# Patient Record
Sex: MALE | Race: BLACK OR AFRICAN AMERICAN | NOT HISPANIC OR LATINO | Employment: PART TIME | URBAN - METROPOLITAN AREA
[De-identification: names, ages, dates, MRNs, and addresses within clinical notes are randomized per-mention and may not be internally consistent; named-entity substitution may affect disease eponyms.]

---

## 2018-10-05 ENCOUNTER — OFFICE VISIT (OUTPATIENT)
Dept: URGENT CARE | Facility: CLINIC | Age: 58
End: 2018-10-05
Payer: OTHER MISCELLANEOUS

## 2018-10-05 VITALS
RESPIRATION RATE: 18 BRPM | OXYGEN SATURATION: 96 % | SYSTOLIC BLOOD PRESSURE: 141 MMHG | DIASTOLIC BLOOD PRESSURE: 79 MMHG | HEIGHT: 70 IN | WEIGHT: 240 LBS | TEMPERATURE: 98 F | HEART RATE: 69 BPM | BODY MASS INDEX: 34.36 KG/M2

## 2018-10-05 DIAGNOSIS — T14.90XA INJURY: Primary | ICD-10-CM

## 2018-10-05 LAB
CTP QC/QA: YES
CTP QC/QA: YES
POC 10 PANEL DRUG SCREEN: NEGATIVE
POC SALIVA ALCOHOL: NEGATIVE

## 2018-10-05 PROCEDURE — 80305 DRUG TEST PRSMV DIR OPT OBS: CPT | Mod: QW,S$GLB,, | Performed by: NURSE PRACTITIONER

## 2018-10-05 PROCEDURE — 80320 DRUG SCREEN QUANTALCOHOLS: CPT | Mod: S$GLB,,, | Performed by: NURSE PRACTITIONER

## 2018-10-05 PROCEDURE — 99203 OFFICE O/P NEW LOW 30 MIN: CPT | Mod: S$GLB,,, | Performed by: NURSE PRACTITIONER

## 2018-10-05 RX ORDER — PRAVASTATIN SODIUM 10 MG/1
10 TABLET ORAL DAILY
COMMUNITY
End: 2022-03-23 | Stop reason: CLARIF

## 2018-10-05 RX ORDER — METFORMIN HYDROCHLORIDE 1000 MG/1
1000 TABLET ORAL
COMMUNITY
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
## 2022-01-07 DIAGNOSIS — M25.561 RIGHT KNEE PAIN, UNSPECIFIED CHRONICITY: Primary | ICD-10-CM

## 2022-01-14 ENCOUNTER — OFFICE VISIT (OUTPATIENT)
Dept: ORTHOPEDICS | Facility: CLINIC | Age: 62
End: 2022-01-14
Payer: OTHER GOVERNMENT

## 2022-01-14 VITALS
HEIGHT: 70 IN | RESPIRATION RATE: 18 BRPM | BODY MASS INDEX: 35.12 KG/M2 | DIASTOLIC BLOOD PRESSURE: 70 MMHG | OXYGEN SATURATION: 99 % | SYSTOLIC BLOOD PRESSURE: 130 MMHG | WEIGHT: 245.31 LBS | HEART RATE: 69 BPM

## 2022-01-14 DIAGNOSIS — M21.161 GENU VARUM OF RIGHT LOWER EXTREMITY: ICD-10-CM

## 2022-01-14 DIAGNOSIS — M17.11 UNILATERAL PRIMARY OSTEOARTHRITIS, RIGHT KNEE: Primary | ICD-10-CM

## 2022-01-14 DIAGNOSIS — M23.91 CHONDRAL DEFECT OF RIGHT TIBIAL PLATEAU: ICD-10-CM

## 2022-01-14 PROCEDURE — 99203 OFFICE O/P NEW LOW 30 MIN: CPT | Mod: S$PBB,,, | Performed by: ORTHOPAEDIC SURGERY

## 2022-01-14 PROCEDURE — 99213 OFFICE O/P EST LOW 20 MIN: CPT | Mod: PBBFAC,PN | Performed by: ORTHOPAEDIC SURGERY

## 2022-01-14 PROCEDURE — 99999 PR PBB SHADOW E&M-EST. PATIENT-LVL III: CPT | Mod: PBBFAC,,, | Performed by: ORTHOPAEDIC SURGERY

## 2022-01-14 PROCEDURE — 99999 PR PBB SHADOW E&M-EST. PATIENT-LVL III: ICD-10-PCS | Mod: PBBFAC,,, | Performed by: ORTHOPAEDIC SURGERY

## 2022-01-14 PROCEDURE — 99203 PR OFFICE/OUTPT VISIT, NEW, LEVL III, 30-44 MIN: ICD-10-PCS | Mod: S$PBB,,, | Performed by: ORTHOPAEDIC SURGERY

## 2022-01-14 RX ORDER — LISINOPRIL 20 MG/1
20 TABLET ORAL DAILY
COMMUNITY

## 2022-01-14 RX ORDER — NAPROXEN SODIUM 220 MG/1
81 TABLET, FILM COATED ORAL DAILY
Status: ON HOLD | COMMUNITY
End: 2022-07-14 | Stop reason: SDUPTHER
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
## 2022-01-21 ENCOUNTER — OFFICE VISIT (OUTPATIENT)
Dept: ORTHOPEDICS | Facility: CLINIC | Age: 62
End: 2022-01-21
Payer: OTHER GOVERNMENT

## 2022-01-21 VITALS
RESPIRATION RATE: 18 BRPM | DIASTOLIC BLOOD PRESSURE: 70 MMHG | WEIGHT: 245 LBS | OXYGEN SATURATION: 99 % | HEIGHT: 70 IN | BODY MASS INDEX: 35.07 KG/M2 | HEART RATE: 83 BPM | SYSTOLIC BLOOD PRESSURE: 130 MMHG

## 2022-01-21 DIAGNOSIS — M17.11 UNILATERAL PRIMARY OSTEOARTHRITIS, RIGHT KNEE: Primary | ICD-10-CM

## 2022-01-21 PROCEDURE — 99999 PR PBB SHADOW E&M-EST. PATIENT-LVL III: CPT | Mod: PBBFAC,,, | Performed by: ORTHOPAEDIC SURGERY

## 2022-01-21 PROCEDURE — 99213 OFFICE O/P EST LOW 20 MIN: CPT | Mod: PBBFAC,PN | Performed by: ORTHOPAEDIC SURGERY

## 2022-01-21 PROCEDURE — 99213 OFFICE O/P EST LOW 20 MIN: CPT | Mod: S$PBB,25,, | Performed by: ORTHOPAEDIC SURGERY

## 2022-01-21 PROCEDURE — 20610 DRAIN/INJ JOINT/BURSA W/O US: CPT | Mod: PBBFAC,PN,RT | Performed by: ORTHOPAEDIC SURGERY

## 2022-01-21 PROCEDURE — 20610 LARGE JOINT ASPIRATION/INJECTION: R KNEE: ICD-10-PCS | Mod: S$PBB,RT,, | Performed by: ORTHOPAEDIC SURGERY

## 2022-01-21 PROCEDURE — 99213 PR OFFICE/OUTPT VISIT, EST, LEVL III, 20-29 MIN: ICD-10-PCS | Mod: S$PBB,25,, | Performed by: ORTHOPAEDIC SURGERY

## 2022-01-21 PROCEDURE — 99999 PR PBB SHADOW E&M-EST. PATIENT-LVL III: ICD-10-PCS | Mod: PBBFAC,,, | Performed by: ORTHOPAEDIC SURGERY

## 2022-01-21 RX ORDER — TRIAMCINOLONE ACETONIDE 40 MG/ML
40 INJECTION, SUSPENSION INTRA-ARTICULAR; INTRAMUSCULAR
Status: DISCONTINUED | OUTPATIENT
Start: 2022-01-21 | End: 2022-01-21 | Stop reason: HOSPADM

## 2022-01-21 RX ADMIN — TRIAMCINOLONE ACETONIDE 40 MG: 40 INJECTION, SUSPENSION INTRA-ARTICULAR; INTRAMUSCULAR at 02:01
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
## 2022-01-21 NOTE — PROCEDURES
Large Joint Aspiration/Injection: R knee    Date/Time: 1/21/2022 2:00 PM  Performed by: Stevo Kohler MD  Authorized by: Stevo Kohler MD     Consent Done?:  Yes (Verbal)  Indications:  Arthritis  Site marked: the procedure site was marked    Timeout: prior to procedure the correct patient, procedure, and site was verified    Prep: patient was prepped and draped in usual sterile fashion      Local anesthesia used?: Yes    Local anesthetic:  Lidocaine 1% without epinephrine    Details:  Needle Size:  22 G  Approach:  Anterolateral  Location:  Knee  Site:  R knee  Medications:  40 mg triamcinolone acetonide 40 mg/mL  Patient tolerance:  Patient tolerated the procedure well with no immediate complications

## 2022-02-11 ENCOUNTER — OFFICE VISIT (OUTPATIENT)
Dept: ORTHOPEDICS | Facility: CLINIC | Age: 62
End: 2022-02-11
Payer: OTHER GOVERNMENT

## 2022-02-11 VITALS
WEIGHT: 242.31 LBS | HEART RATE: 83 BPM | RESPIRATION RATE: 18 BRPM | HEIGHT: 70 IN | DIASTOLIC BLOOD PRESSURE: 70 MMHG | BODY MASS INDEX: 34.69 KG/M2 | OXYGEN SATURATION: 99 % | SYSTOLIC BLOOD PRESSURE: 130 MMHG

## 2022-02-11 DIAGNOSIS — M21.161 GENU VARUM OF RIGHT LOWER EXTREMITY: Primary | ICD-10-CM

## 2022-02-11 DIAGNOSIS — M17.11 UNILATERAL PRIMARY OSTEOARTHRITIS, RIGHT KNEE: ICD-10-CM

## 2022-02-11 DIAGNOSIS — M25.561 RIGHT KNEE PAIN, UNSPECIFIED CHRONICITY: Primary | ICD-10-CM

## 2022-02-11 PROCEDURE — 99214 PR OFFICE/OUTPT VISIT, EST, LEVL IV, 30-39 MIN: ICD-10-PCS | Mod: S$PBB,,, | Performed by: ORTHOPAEDIC SURGERY

## 2022-02-11 PROCEDURE — 99214 OFFICE O/P EST MOD 30 MIN: CPT | Mod: S$PBB,,, | Performed by: ORTHOPAEDIC SURGERY

## 2022-02-11 PROCEDURE — 99999 PR PBB SHADOW E&M-EST. PATIENT-LVL V: CPT | Mod: PBBFAC,,, | Performed by: ORTHOPAEDIC SURGERY

## 2022-02-11 PROCEDURE — 99999 PR PBB SHADOW E&M-EST. PATIENT-LVL V: ICD-10-PCS | Mod: PBBFAC,,, | Performed by: ORTHOPAEDIC SURGERY

## 2022-02-11 PROCEDURE — 99215 OFFICE O/P EST HI 40 MIN: CPT | Mod: PBBFAC,PN | Performed by: ORTHOPAEDIC SURGERY

## 2022-02-11 RX ORDER — SODIUM CHLORIDE 9 MG/ML
INJECTION, SOLUTION INTRAVENOUS CONTINUOUS
Status: CANCELLED | OUTPATIENT
Start: 2022-02-11

## 2022-02-11 RX ORDER — MUPIROCIN 20 MG/G
OINTMENT TOPICAL
Status: CANCELLED | OUTPATIENT
Start: 2022-02-11
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
## 2022-02-25 ENCOUNTER — HOSPITAL ENCOUNTER (OUTPATIENT)
Dept: RADIOLOGY | Facility: HOSPITAL | Age: 62
Discharge: HOME | End: 2022-02-25
Attending: ORTHOPAEDIC SURGERY
Payer: OTHER GOVERNMENT

## 2022-02-25 DIAGNOSIS — M25.561 RIGHT KNEE PAIN, UNSPECIFIED CHRONICITY: ICD-10-CM

## 2022-02-25 PROCEDURE — 73700 CT KNEE WITHOUT CONTRAST RIGHT W/MAKO PROTOCOL: ICD-10-PCS | Mod: 26,RT,, | Performed by: RADIOLOGY

## 2022-02-25 PROCEDURE — 73700 CT LOWER EXTREMITY W/O DYE: CPT | Mod: 26,RT,, | Performed by: RADIOLOGY

## 2022-02-25 PROCEDURE — 73700 CT LOWER EXTREMITY W/O DYE: CPT | Mod: TC,RT

## 2022-03-23 ENCOUNTER — HOSPITAL ENCOUNTER (OUTPATIENT)
Dept: PREADMISSION TESTING | Facility: HOSPITAL | Age: 62
Discharge: HOME | End: 2022-03-23
Attending: ORTHOPAEDIC SURGERY
Payer: OTHER GOVERNMENT

## 2022-03-23 ENCOUNTER — TELEPHONE (OUTPATIENT)
Dept: ORTHOPEDICS | Facility: CLINIC | Age: 62
End: 2022-03-23

## 2022-03-23 VITALS
RESPIRATION RATE: 16 BRPM | HEART RATE: 65 BPM | WEIGHT: 246.94 LBS | TEMPERATURE: 98 F | HEIGHT: 70 IN | OXYGEN SATURATION: 98 % | SYSTOLIC BLOOD PRESSURE: 144 MMHG | BODY MASS INDEX: 35.35 KG/M2 | DIASTOLIC BLOOD PRESSURE: 69 MMHG

## 2022-03-23 DIAGNOSIS — Z01.818 PREOPERATIVE TESTING: Primary | ICD-10-CM

## 2022-03-23 DIAGNOSIS — M17.11 UNILATERAL PRIMARY OSTEOARTHRITIS, RIGHT KNEE: ICD-10-CM

## 2022-03-23 DIAGNOSIS — Z01.818 PRE-OP EVALUATION: ICD-10-CM

## 2022-03-23 LAB
ALBUMIN SERPL BCP-MCNC: 3.5 G/DL (ref 3.5–5.2)
ALP SERPL-CCNC: 49 U/L (ref 55–135)
ALT SERPL W/O P-5'-P-CCNC: 33 U/L (ref 10–44)
ANION GAP SERPL CALC-SCNC: 4 MMOL/L (ref 8–16)
AST SERPL-CCNC: 33 U/L (ref 10–40)
BASOPHILS # BLD AUTO: 0.03 K/UL (ref 0–0.2)
BASOPHILS NFR BLD: 0.5 % (ref 0–1.9)
BILIRUB SERPL-MCNC: 0.6 MG/DL (ref 0.1–1)
BILIRUB UR QL STRIP: NEGATIVE
BUN SERPL-MCNC: 16 MG/DL (ref 8–23)
CALCIUM SERPL-MCNC: 9.6 MG/DL (ref 8.7–10.5)
CHLORIDE SERPL-SCNC: 107 MMOL/L (ref 95–110)
CLARITY UR: CLEAR
CO2 SERPL-SCNC: 29 MMOL/L (ref 23–29)
COLOR UR: YELLOW
CREAT SERPL-MCNC: 1 MG/DL (ref 0.5–1.4)
DIFFERENTIAL METHOD: ABNORMAL
EOSINOPHIL # BLD AUTO: 0.2 K/UL (ref 0–0.5)
EOSINOPHIL NFR BLD: 3.6 % (ref 0–8)
ERYTHROCYTE [DISTWIDTH] IN BLOOD BY AUTOMATED COUNT: 12.9 % (ref 11.5–14.5)
EST. GFR  (AFRICAN AMERICAN): >60 ML/MIN/1.73 M^2
EST. GFR  (NON AFRICAN AMERICAN): >60 ML/MIN/1.73 M^2
GLUCOSE SERPL-MCNC: 140 MG/DL (ref 70–110)
GLUCOSE UR QL STRIP: NEGATIVE
HCT VFR BLD AUTO: 39.9 % (ref 40–54)
HGB BLD-MCNC: 13.3 G/DL (ref 14–18)
HGB UR QL STRIP: NEGATIVE
IMM GRANULOCYTES # BLD AUTO: 0.01 K/UL (ref 0–0.04)
IMM GRANULOCYTES NFR BLD AUTO: 0.2 % (ref 0–0.5)
INR PPP: 1 (ref 0.8–1.2)
KETONES UR QL STRIP: NEGATIVE
LEUKOCYTE ESTERASE UR QL STRIP: NEGATIVE
LYMPHOCYTES # BLD AUTO: 1.8 K/UL (ref 1–4.8)
LYMPHOCYTES NFR BLD: 29.2 % (ref 18–48)
MCH RBC QN AUTO: 28.9 PG (ref 27–31)
MCHC RBC AUTO-ENTMCNC: 33.3 G/DL (ref 32–36)
MCV RBC AUTO: 87 FL (ref 82–98)
MONOCYTES # BLD AUTO: 0.6 K/UL (ref 0.3–1)
MONOCYTES NFR BLD: 9.8 % (ref 4–15)
NEUTROPHILS # BLD AUTO: 3.6 K/UL (ref 1.8–7.7)
NEUTROPHILS NFR BLD: 56.7 % (ref 38–73)
NITRITE UR QL STRIP: NEGATIVE
NRBC BLD-RTO: 0 /100 WBC
PH UR STRIP: 7 [PH] (ref 5–8)
PLATELET # BLD AUTO: 220 K/UL (ref 150–450)
PMV BLD AUTO: 10.8 FL (ref 9.2–12.9)
POTASSIUM SERPL-SCNC: 4 MMOL/L (ref 3.5–5.1)
PROT SERPL-MCNC: 7.2 G/DL (ref 6–8.4)
PROT UR QL STRIP: NEGATIVE
PROTHROMBIN TIME: 10.8 SEC (ref 9–12.5)
RBC # BLD AUTO: 4.61 M/UL (ref 4.6–6.2)
SODIUM SERPL-SCNC: 140 MMOL/L (ref 136–145)
SP GR UR STRIP: 1.02 (ref 1–1.03)
URN SPEC COLLECT METH UR: NORMAL
UROBILINOGEN UR STRIP-ACNC: NEGATIVE EU/DL
WBC # BLD AUTO: 6.31 K/UL (ref 3.9–12.7)

## 2022-03-23 PROCEDURE — 93010 EKG 12-LEAD: ICD-10-PCS | Mod: ,,, | Performed by: INTERNAL MEDICINE

## 2022-03-23 PROCEDURE — 93010 ELECTROCARDIOGRAM REPORT: CPT | Mod: ,,, | Performed by: INTERNAL MEDICINE

## 2022-03-23 PROCEDURE — 85610 PROTHROMBIN TIME: CPT | Performed by: ORTHOPAEDIC SURGERY

## 2022-03-23 PROCEDURE — 36415 COLL VENOUS BLD VENIPUNCTURE: CPT | Performed by: ORTHOPAEDIC SURGERY

## 2022-03-23 PROCEDURE — 80053 COMPREHEN METABOLIC PANEL: CPT | Performed by: ORTHOPAEDIC SURGERY

## 2022-03-23 PROCEDURE — 93005 ELECTROCARDIOGRAM TRACING: CPT

## 2022-03-23 PROCEDURE — 85025 COMPLETE CBC W/AUTO DIFF WBC: CPT | Performed by: ORTHOPAEDIC SURGERY

## 2022-03-23 PROCEDURE — 81003 URINALYSIS AUTO W/O SCOPE: CPT | Performed by: ORTHOPAEDIC SURGERY

## 2022-03-23 RX ORDER — ATORVASTATIN CALCIUM 10 MG/1
10 TABLET, FILM COATED ORAL DAILY
COMMUNITY

## 2022-03-23 RX ORDER — NAPROXEN 500 MG/1
500 TABLET ORAL 2 TIMES DAILY PRN
COMMUNITY

## 2022-03-23 RX ORDER — LANOLIN ALCOHOL/MO/W.PET/CERES
100 CREAM (GRAM) TOPICAL DAILY
COMMUNITY

## 2022-03-23 NOTE — TELEPHONE ENCOUNTER
----- Message from Mariely Vega sent at 3/23/2022 12:57 PM CDT -----  Type: Patient Call Back    Who called:Juana- Wife     What is the request in detail: need to speak with Steph regarding FMLA paperwork. Please call     Can the clinic reply by [facility]? No     Would the patient rather a call back or a response via [facility]? Call     Best call back number: [phone]

## 2022-03-23 NOTE — DISCHARGE INSTRUCTIONS
Before 7 AM, enter through the Emergency Entrance..   After 7 AM enter through the Main Entrance.      Your procedure  is scheduled for __3/30/2022________.    Call [phone] between 2pm and 5pm on _3/29/2022______to find out your arrival time for the day of surgery.    You may use the main entrance to the hospital on the [facility] side, or the entrance that is next to the [facility].    You may have two visitors.  Visiting hours for non-COVID-19 patients expanded to 24/7 (still restricted to one visitor)  Youth visitation changed from age 18 to age 12.      You will be going to the Same Day Surgery Unit on the 2nd floor of the hospital.    Important instructions:  Do not eat anything after midnight.  You may have plain water, non carbonated.  You may also have Gatorade or Powerade after midnight.    Stop all fluids 2 hours before your surgery.    It is okay to brush your teeth.  Do not have gum, candy or mints.    SEE MEDICATION SHEET.   TAKE MEDICATIONS AS DIRECTED WITH SIPS OF WATER.      Do not take any diabetic medication on the morning of surgery unless instructed to do so by your doctor or pre op nurse.    STOP taking Aspirin, Ibuprofen,  Advil, Motrin, Mobic(meloxicam), Aleve (naproxen), Fish oil, and Vitamin E for at least 7 days before your surgery.     You may take Tylenol if needed which is not a blood thinner.    Please shower the night before and the morning of your surgery.      Use Hibiclens soap as instructed by your pre op nurse.   Please place clean linens on your bed the night before surgery. Please wear fresh clean clothing after each shower.    No shaving of procedural area at least 4-5 days before surgery due to increased risk of skin irritation and/or possible infection.    Contact lenses and removable denture work may not be worn during your procedure.    You may wear deodorant only. If you are having breast surgery, do not wear deodorant on the operative side.    Do not wear  powder, body lotion, perfume/cologne or make-up.    Do not wear any jewelry or have any metal on your body.    You will be asked to remove any dentures or partials for the procedure.    If you are going home on the same day of surgery, you must arrange for a family member or a friend to drive you home.  Public transportation is prohibited.  You will not be able to drive home if you were given anesthesia or sedation.    Patients who want to have their Post-op prescriptions filled from our in-house [facility], bring a Credit/Debit Card  or cash with you. A co-pay may be required.  The pharmacy closes at 5:30 pm.    Wear loose fitting clothes allowing for bandages.    Please leave money and valuables home.      You may bring your cell phone.    Call the doctor if fever or illness should occur before your surgery.    Call [phone] to contact us here if needed.

## 2022-03-27 ENCOUNTER — PATIENT MESSAGE (OUTPATIENT)
Dept: SURGERY | Facility: HOSPITAL | Age: 62
End: 2022-03-27
Payer: OTHER GOVERNMENT

## 2022-03-28 ENCOUNTER — PATIENT MESSAGE (OUTPATIENT)
Dept: SURGERY | Facility: HOSPITAL | Age: 62
End: 2022-03-28
Payer: OTHER GOVERNMENT

## 2022-04-13 ENCOUNTER — PATIENT MESSAGE (OUTPATIENT)
Dept: SURGERY | Facility: HOSPITAL | Age: 62
End: 2022-04-13
Payer: OTHER GOVERNMENT

## 2022-04-14 ENCOUNTER — PATIENT MESSAGE (OUTPATIENT)
Dept: SURGERY | Facility: HOSPITAL | Age: 62
End: 2022-04-14
Payer: OTHER GOVERNMENT

## 2022-04-25 ENCOUNTER — PATIENT MESSAGE (OUTPATIENT)
Dept: SURGERY | Facility: HOSPITAL | Age: 62
End: 2022-04-25
Payer: OTHER GOVERNMENT

## 2022-04-27 ENCOUNTER — HOSPITAL ENCOUNTER (OUTPATIENT)
Dept: PREADMISSION TESTING | Facility: HOSPITAL | Age: 62
Discharge: HOME | End: 2022-04-27
Attending: ORTHOPAEDIC SURGERY
Payer: OTHER GOVERNMENT

## 2022-04-27 VITALS
TEMPERATURE: 98 F | DIASTOLIC BLOOD PRESSURE: 76 MMHG | HEIGHT: 70 IN | SYSTOLIC BLOOD PRESSURE: 140 MMHG | RESPIRATION RATE: 18 BRPM | BODY MASS INDEX: 35.32 KG/M2 | WEIGHT: 246.69 LBS | OXYGEN SATURATION: 98 % | HEART RATE: 74 BPM

## 2022-04-27 DIAGNOSIS — Z01.818 PREOPERATIVE TESTING: Primary | ICD-10-CM

## 2022-04-27 LAB
ALBUMIN SERPL BCP-MCNC: 3.8 G/DL (ref 3.5–5.2)
ALP SERPL-CCNC: 57 U/L (ref 55–135)
ALT SERPL W/O P-5'-P-CCNC: 31 U/L (ref 10–44)
ANION GAP SERPL CALC-SCNC: 11 MMOL/L (ref 8–16)
AST SERPL-CCNC: 27 U/L (ref 10–40)
BASOPHILS # BLD AUTO: 0.03 K/UL (ref 0–0.2)
BASOPHILS NFR BLD: 0.4 % (ref 0–1.9)
BILIRUB SERPL-MCNC: 0.3 MG/DL (ref 0.1–1)
BILIRUB UR QL STRIP: NEGATIVE
BUN SERPL-MCNC: 17 MG/DL (ref 8–23)
CALCIUM SERPL-MCNC: 9.8 MG/DL (ref 8.7–10.5)
CHLORIDE SERPL-SCNC: 106 MMOL/L (ref 95–110)
CLARITY UR: CLEAR
CO2 SERPL-SCNC: 24 MMOL/L (ref 23–29)
COLOR UR: YELLOW
CREAT SERPL-MCNC: 1 MG/DL (ref 0.5–1.4)
DIFFERENTIAL METHOD: ABNORMAL
EOSINOPHIL # BLD AUTO: 0.4 K/UL (ref 0–0.5)
EOSINOPHIL NFR BLD: 5.6 % (ref 0–8)
ERYTHROCYTE [DISTWIDTH] IN BLOOD BY AUTOMATED COUNT: 13.5 % (ref 11.5–14.5)
EST. GFR  (AFRICAN AMERICAN): >60 ML/MIN/1.73 M^2
EST. GFR  (NON AFRICAN AMERICAN): >60 ML/MIN/1.73 M^2
GLUCOSE SERPL-MCNC: 165 MG/DL (ref 70–110)
GLUCOSE UR QL STRIP: NEGATIVE
HCT VFR BLD AUTO: 40.6 % (ref 40–54)
HGB BLD-MCNC: 13.2 G/DL (ref 14–18)
HGB UR QL STRIP: NEGATIVE
IMM GRANULOCYTES # BLD AUTO: 0.06 K/UL (ref 0–0.04)
IMM GRANULOCYTES NFR BLD AUTO: 0.9 % (ref 0–0.5)
INR PPP: 1 (ref 0.8–1.2)
KETONES UR QL STRIP: NEGATIVE
LEUKOCYTE ESTERASE UR QL STRIP: NEGATIVE
LYMPHOCYTES # BLD AUTO: 2.2 K/UL (ref 1–4.8)
LYMPHOCYTES NFR BLD: 31.1 % (ref 18–48)
MCH RBC QN AUTO: 28.6 PG (ref 27–31)
MCHC RBC AUTO-ENTMCNC: 32.5 G/DL (ref 32–36)
MCV RBC AUTO: 88 FL (ref 82–98)
MONOCYTES # BLD AUTO: 0.7 K/UL (ref 0.3–1)
MONOCYTES NFR BLD: 9.8 % (ref 4–15)
NEUTROPHILS # BLD AUTO: 3.6 K/UL (ref 1.8–7.7)
NEUTROPHILS NFR BLD: 52.2 % (ref 38–73)
NITRITE UR QL STRIP: NEGATIVE
NRBC BLD-RTO: 0 /100 WBC
PH UR STRIP: 6 [PH] (ref 5–8)
PLATELET # BLD AUTO: 192 K/UL (ref 150–450)
PMV BLD AUTO: 11.1 FL (ref 9.2–12.9)
POTASSIUM SERPL-SCNC: 4.2 MMOL/L (ref 3.5–5.1)
PROT SERPL-MCNC: 7.7 G/DL (ref 6–8.4)
PROT UR QL STRIP: NEGATIVE
PROTHROMBIN TIME: 10.4 SEC (ref 9–12.5)
RBC # BLD AUTO: 4.62 M/UL (ref 4.6–6.2)
SODIUM SERPL-SCNC: 141 MMOL/L (ref 136–145)
SP GR UR STRIP: 1.02 (ref 1–1.03)
URN SPEC COLLECT METH UR: NORMAL
UROBILINOGEN UR STRIP-ACNC: NEGATIVE EU/DL
WBC # BLD AUTO: 6.95 K/UL (ref 3.9–12.7)

## 2022-04-27 PROCEDURE — 85610 PROTHROMBIN TIME: CPT | Performed by: ORTHOPAEDIC SURGERY

## 2022-04-27 PROCEDURE — 80053 COMPREHEN METABOLIC PANEL: CPT | Performed by: ORTHOPAEDIC SURGERY

## 2022-04-27 PROCEDURE — 81003 URINALYSIS AUTO W/O SCOPE: CPT | Performed by: ORTHOPAEDIC SURGERY

## 2022-04-27 PROCEDURE — 85025 COMPLETE CBC W/AUTO DIFF WBC: CPT | Performed by: ORTHOPAEDIC SURGERY

## 2022-04-27 NOTE — DISCHARGE INSTRUCTIONS
Before 7 AM, enter through the Emergency Entrance..   After 7 AM enter through the Main Entrance.      Your procedure  is scheduled for __5/4/2022________.    Call [phone] between 2pm and 5pm on __5/3/2022_____to find out your arrival time for the day of surgery.    You may use the main entrance to the hospital on the [facility] side, or the entrance that is next to the [facility].    You may have two visitors.  Visiting hours for non-COVID-19 patients expanded to 24/7 (still restricted to one visitor)  Youth visitation changed from age 18 to age 11.      You will be going to the Same Day Surgery Unit on the 2nd floor of the hospital.    Important instructions:  Do not eat anything after midnight.  You may have plain water, non carbonated.  You may also have Gatorade or Powerade after midnight.    Stop all fluids 2 hours before your surgery.    It is okay to brush your teeth.  Do not have gum, candy or mints.    SEE MEDICATION SHEET.   TAKE MEDICATIONS AS DIRECTED WITH SIPS OF WATER.      Do not take any diabetic medication on the morning of surgery unless instructed to do so by your doctor or pre op nurse.    STOP taking Aspirin, Ibuprofen,  Advil, Motrin, Mobic(meloxicam), Aleve (naproxen), Fish oil, and Vitamin E for at least 7 days before your surgery.     You may take Tylenol if needed which is not a blood thinner.    Please shower the night before and the morning of your surgery.      Use Hibiclens soap as instructed by your pre op nurse.   Please place clean linens on your bed the night before surgery. Please wear fresh clean clothing after each shower.    No shaving of procedural area at least 4-5 days before surgery due to increased risk of skin irritation and/or possible infection.    Contact lenses and removable denture work may not be worn during your procedure.    You may wear deodorant only. If you are having breast surgery, do not wear deodorant on the operative side.    Do not wear  powder, body lotion, perfume/cologne or make-up.    Do not wear any jewelry or have any metal on your body.    You will be asked to remove any dentures or partials for the procedure.    If you are going home on the same day of surgery, you must arrange for a family member or a friend to drive you home.  Public transportation is prohibited.  You will not be able to drive home if you were given anesthesia or sedation.    Patients who want to have their Post-op prescriptions filled from our in-house [facility], bring a Credit/Debit Card  or cash with you. A co-pay may be required.  The pharmacy closes at 5:30 pm.    Wear loose fitting clothes allowing for bandages.    Please leave money and valuables home.      You may bring your cell phone.    Call the doctor if fever or illness should occur before your surgery.    Call [phone] to contact us here if needed.

## 2022-06-10 ENCOUNTER — OFFICE VISIT (OUTPATIENT)
Dept: ORTHOPEDICS | Facility: CLINIC | Age: 62
End: 2022-06-10
Payer: OTHER GOVERNMENT

## 2022-06-10 VITALS
OXYGEN SATURATION: 99 % | RESPIRATION RATE: 15 BRPM | HEIGHT: 69 IN | SYSTOLIC BLOOD PRESSURE: 135 MMHG | BODY MASS INDEX: 36.43 KG/M2 | WEIGHT: 246 LBS | DIASTOLIC BLOOD PRESSURE: 76 MMHG | HEART RATE: 75 BPM

## 2022-06-10 DIAGNOSIS — M23.91 CHONDRAL DEFECT OF RIGHT TIBIAL PLATEAU: ICD-10-CM

## 2022-06-10 DIAGNOSIS — M17.11 UNILATERAL PRIMARY OSTEOARTHRITIS, RIGHT KNEE: Primary | ICD-10-CM

## 2022-06-10 DIAGNOSIS — M21.161 GENU VARUM OF RIGHT LOWER EXTREMITY: ICD-10-CM

## 2022-06-10 PROCEDURE — 99999 PR PBB SHADOW E&M-EST. PATIENT-LVL V: ICD-10-PCS | Mod: PBBFAC,,, | Performed by: ORTHOPAEDIC SURGERY

## 2022-06-10 PROCEDURE — 99999 PR PBB SHADOW E&M-EST. PATIENT-LVL V: CPT | Mod: PBBFAC,,, | Performed by: ORTHOPAEDIC SURGERY

## 2022-06-10 PROCEDURE — 99215 OFFICE O/P EST HI 40 MIN: CPT | Mod: PBBFAC,PN | Performed by: ORTHOPAEDIC SURGERY

## 2022-06-10 PROCEDURE — 99213 OFFICE O/P EST LOW 20 MIN: CPT | Mod: S$PBB,,, | Performed by: ORTHOPAEDIC SURGERY

## 2022-06-10 PROCEDURE — 99213 PR OFFICE/OUTPT VISIT, EST, LEVL III, 20-29 MIN: ICD-10-PCS | Mod: S$PBB,,, | Performed by: ORTHOPAEDIC SURGERY

## 2022-06-10 RX ORDER — SODIUM CHLORIDE 9 MG/ML
INJECTION, SOLUTION INTRAVENOUS CONTINUOUS
Status: CANCELLED | OUTPATIENT
Start: 2022-06-10

## 2022-06-10 RX ORDER — MUPIROCIN 20 MG/G
OINTMENT TOPICAL
Status: CANCELLED | OUTPATIENT
Start: 2022-06-10
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
## 2022-06-15 ENCOUNTER — PATIENT MESSAGE (OUTPATIENT)
Dept: SURGERY | Facility: HOSPITAL | Age: 62
End: 2022-06-15
Payer: OTHER GOVERNMENT

## 2022-06-16 ENCOUNTER — PATIENT MESSAGE (OUTPATIENT)
Dept: SURGERY | Facility: HOSPITAL | Age: 62
End: 2022-06-16
Payer: OTHER GOVERNMENT

## 2022-06-23 ENCOUNTER — PATIENT MESSAGE (OUTPATIENT)
Dept: SURGERY | Facility: HOSPITAL | Age: 62
End: 2022-06-23
Payer: OTHER GOVERNMENT

## 2022-06-29 ENCOUNTER — HOSPITAL ENCOUNTER (OUTPATIENT)
Dept: RADIOLOGY | Facility: HOSPITAL | Age: 62
Discharge: HOME | End: 2022-06-29
Attending: ORTHOPAEDIC SURGERY
Payer: OTHER GOVERNMENT

## 2022-06-29 DIAGNOSIS — M17.11 UNILATERAL PRIMARY OSTEOARTHRITIS, RIGHT KNEE: ICD-10-CM

## 2022-06-29 PROCEDURE — 73700 CT LOWER EXTREMITY W/O DYE: CPT | Mod: TC,RT

## 2022-06-29 PROCEDURE — 73700 CT LOWER EXTREMITY W/O DYE: CPT | Mod: 26,RT,, | Performed by: RADIOLOGY

## 2022-06-29 PROCEDURE — 73700 CT KNEE WITHOUT CONTRAST RIGHT W/MAKO PROTOCOL: ICD-10-PCS | Mod: 26,RT,, | Performed by: RADIOLOGY

## 2022-07-06 ENCOUNTER — HOSPITAL ENCOUNTER (OUTPATIENT)
Dept: PREADMISSION TESTING | Facility: HOSPITAL | Age: 62
Discharge: HOME | End: 2022-07-06
Attending: NURSE PRACTITIONER
Payer: OTHER GOVERNMENT

## 2022-07-06 ENCOUNTER — HOSPITAL ENCOUNTER (OUTPATIENT)
Dept: RADIOLOGY | Facility: HOSPITAL | Age: 62
Discharge: HOME | End: 2022-07-06
Attending: NURSE PRACTITIONER
Payer: OTHER GOVERNMENT

## 2022-07-06 ENCOUNTER — ANESTHESIA EVENT (OUTPATIENT)
Dept: SURGERY | Facility: HOSPITAL | Age: 62
End: 2022-07-06
Payer: OTHER GOVERNMENT

## 2022-07-06 VITALS
DIASTOLIC BLOOD PRESSURE: 70 MMHG | TEMPERATURE: 98 F | OXYGEN SATURATION: 99 % | BODY MASS INDEX: 34.13 KG/M2 | WEIGHT: 243.81 LBS | SYSTOLIC BLOOD PRESSURE: 131 MMHG | RESPIRATION RATE: 20 BRPM | HEIGHT: 71 IN | HEART RATE: 68 BPM

## 2022-07-06 DIAGNOSIS — M17.11 UNILATERAL PRIMARY OSTEOARTHRITIS, RIGHT KNEE: ICD-10-CM

## 2022-07-06 DIAGNOSIS — Z01.818 PREOPERATIVE TESTING: Primary | ICD-10-CM

## 2022-07-06 LAB
ANION GAP SERPL CALC-SCNC: 6 MMOL/L (ref 8–16)
BASOPHILS # BLD AUTO: 0.04 K/UL (ref 0–0.2)
BASOPHILS NFR BLD: 0.6 % (ref 0–1.9)
BILIRUB UR QL STRIP: NEGATIVE
BUN SERPL-MCNC: 16 MG/DL (ref 8–23)
CALCIUM SERPL-MCNC: 9.5 MG/DL (ref 8.7–10.5)
CHLORIDE SERPL-SCNC: 105 MMOL/L (ref 95–110)
CLARITY UR: CLEAR
CO2 SERPL-SCNC: 30 MMOL/L (ref 23–29)
COLOR UR: YELLOW
CREAT SERPL-MCNC: 1 MG/DL (ref 0.5–1.4)
DIFFERENTIAL METHOD: ABNORMAL
EOSINOPHIL # BLD AUTO: 0.2 K/UL (ref 0–0.5)
EOSINOPHIL NFR BLD: 3.7 % (ref 0–8)
ERYTHROCYTE [DISTWIDTH] IN BLOOD BY AUTOMATED COUNT: 13.2 % (ref 11.5–14.5)
EST. GFR  (AFRICAN AMERICAN): >60 ML/MIN/1.73 M^2
EST. GFR  (NON AFRICAN AMERICAN): >60 ML/MIN/1.73 M^2
GLUCOSE SERPL-MCNC: 133 MG/DL (ref 70–110)
GLUCOSE UR QL STRIP: NEGATIVE
HCT VFR BLD AUTO: 41.6 % (ref 40–54)
HGB BLD-MCNC: 13.9 G/DL (ref 14–18)
HGB UR QL STRIP: NEGATIVE
IMM GRANULOCYTES # BLD AUTO: 0.02 K/UL (ref 0–0.04)
IMM GRANULOCYTES NFR BLD AUTO: 0.3 % (ref 0–0.5)
INR PPP: 1 (ref 0.8–1.2)
KETONES UR QL STRIP: NEGATIVE
LEUKOCYTE ESTERASE UR QL STRIP: NEGATIVE
LYMPHOCYTES # BLD AUTO: 2.2 K/UL (ref 1–4.8)
LYMPHOCYTES NFR BLD: 33.5 % (ref 18–48)
MCH RBC QN AUTO: 28.7 PG (ref 27–31)
MCHC RBC AUTO-ENTMCNC: 33.4 G/DL (ref 32–36)
MCV RBC AUTO: 86 FL (ref 82–98)
MONOCYTES # BLD AUTO: 0.7 K/UL (ref 0.3–1)
MONOCYTES NFR BLD: 11 % (ref 4–15)
NEUTROPHILS # BLD AUTO: 3.3 K/UL (ref 1.8–7.7)
NEUTROPHILS NFR BLD: 50.9 % (ref 38–73)
NITRITE UR QL STRIP: NEGATIVE
NRBC BLD-RTO: 0 /100 WBC
PH UR STRIP: 6 [PH] (ref 5–8)
PLATELET # BLD AUTO: 231 K/UL (ref 150–450)
PMV BLD AUTO: 11.1 FL (ref 9.2–12.9)
POTASSIUM SERPL-SCNC: 4 MMOL/L (ref 3.5–5.1)
PROT UR QL STRIP: NEGATIVE
PROTHROMBIN TIME: 10.7 SEC (ref 9–12.5)
RBC # BLD AUTO: 4.84 M/UL (ref 4.6–6.2)
SODIUM SERPL-SCNC: 141 MMOL/L (ref 136–145)
SP GR UR STRIP: 1.03 (ref 1–1.03)
URN SPEC COLLECT METH UR: NORMAL
UROBILINOGEN UR STRIP-ACNC: NEGATIVE EU/DL
WBC # BLD AUTO: 6.56 K/UL (ref 3.9–12.7)

## 2022-07-06 PROCEDURE — 80048 BASIC METABOLIC PNL TOTAL CA: CPT | Performed by: NURSE PRACTITIONER

## 2022-07-06 PROCEDURE — 71045 X-RAY EXAM CHEST 1 VIEW: CPT | Mod: 26,,, | Performed by: RADIOLOGY

## 2022-07-06 PROCEDURE — 71045 XR CHEST 1 VIEW PRE-OP: ICD-10-PCS | Mod: 26,,, | Performed by: RADIOLOGY

## 2022-07-06 PROCEDURE — 85610 PROTHROMBIN TIME: CPT | Performed by: ORTHOPAEDIC SURGERY

## 2022-07-06 PROCEDURE — 71045 X-RAY EXAM CHEST 1 VIEW: CPT | Mod: TC,FY

## 2022-07-06 PROCEDURE — 81003 URINALYSIS AUTO W/O SCOPE: CPT | Performed by: ORTHOPAEDIC SURGERY

## 2022-07-06 PROCEDURE — 85025 COMPLETE CBC W/AUTO DIFF WBC: CPT | Performed by: NURSE PRACTITIONER

## 2022-07-06 RX ORDER — FLUTICASONE PROPIONATE 50 MCG
1 SPRAY, SUSPENSION (ML) NASAL DAILY PRN
COMMUNITY

## 2022-07-06 NOTE — PLAN OF CARE
Pre-operative instructions, medication directives and pain scales reviewed with patient. All questions the patient had  were answered. Re-assurance about surgical procedure and day of surgery routine given as needed. The patient verbalized understanding of the pre-op instructions.The pt will return on 7/12 at 7am for Type and screen--the pt voiced understanding.

## 2022-07-06 NOTE — DISCHARGE INSTRUCTIONS
Before 7 AM, enter through the Emergency Entrance..   After 7 AM enter through the Main Entrance.      Your procedure  is scheduled for __THURS DAY 7/14________.    Call [phone] between 2pm and 5pm on _WED 7/13______to find out your arrival time for the day of surgery.      You may have two visitors.  Visiting hours for non-COVID-19 patients expanded to 24/7 (still restricted to one visitor)  Youth visitation changed from age 18 to age 12.      You will be going to the Same Day Surgery Unit on the 2nd floor of the hospital.    Important instructions:  Do not eat anything after midnight.  You may have plain water, non carbonated.  You may also have Gatorade or Powerade after midnight.    Stop all fluids 2 hours before your surgery.    It is okay to brush your teeth.  Do not have gum, candy or mints.    SEE MEDICATION SHEET.   TAKE MEDICATIONS AS DIRECTED WITH SIPS OF WATER.      Do not take any diabetic medication on the morning of surgery unless instructed to do so by your doctor or pre op nurse.    STOP taking Aspirin, Ibuprofen,  Advil, Motrin, Mobic(meloxicam), Aleve (naproxen), Fish oil, and Vitamin E for at least 7 days before your surgery.     You may take Tylenol if needed which is not a blood thinner.    Please shower the night before and the morning of your surgery.          Use Hibiclens soap as instructed by your pre op nurse.   Please place clean linens on your bed the night before surgery. Please wear fresh clean clothing after each shower.    No shaving of procedural area at least 4-5 days before surgery due to increased risk of skin irritation and/or possible infection.    removable denture work may not be worn during your procedure.    You may wear deodorant only.     Do not wear powder, body lotion, perfume/cologne or make-up.    Do not wear any jewelry or have any metal on your body.    You will be asked to remove any dentures or partials for the procedure.    If you are going home on the  same day of surgery, you must arrange for a family member or a friend to drive you home.  Public transportation is prohibited.  You will not be able to drive home if you were given anesthesia or sedation.    Patients who want to have their Post-op prescriptions filled from our in-house [facility], bring a Credit/Debit Card  or cash with you. A co-pay may be required.  The pharmacy closes at 5:30 pm.        Wear loose fitting clothes allowing for bandages.    Please leave valuables home.      You may bring your cell phone.    Call the doctor if fever or illness should occur before your surgery.    Call [phone] to contact us here if needed.    Patient baseline mental status

## 2022-07-06 NOTE — ANESTHESIA PREPROCEDURE EVALUATION
07/06/2022  Tiki Willoughby is a 62 y.o., male   To undergo Procedure(s) (LRB):  ARTHROPLASTY, KNEE, TOTAL. BETH (Right)       Past Medical History:  Past Medical History:   Diagnosis Date    Arthritis     Diabetes mellitus     History of respiratory tract infection     Hypertension     Obesity (BMI 30-39.9)     Obesity (BMI 30-39.9)        Past Surgical History:  Past Surgical History:   Procedure Laterality Date    JOINT REPLACEMENT      left knee       Social History:  Social History     Socioeconomic History    Marital status:    Tobacco Use    Smoking status: Never Smoker    Smokeless tobacco: Never Used   Substance and Sexual Activity    Alcohol use: Never    Drug use: Never    Sexual activity: Not Currently       Medications:  No current facility-administered medications on file prior to encounter.     Current Outpatient Medications on File Prior to Encounter   Medication Sig Dispense Refill    lisinopriL (PRINIVIL,ZESTRIL) 20 MG tablet Take 20 mg by mouth once daily.      metFORMIN (GLUCOPHAGE) 1000 MG tablet Take 1,000 mg by mouth daily with breakfast.      naproxen (EC NAPROSYN) 500 MG EC tablet Take 500 mg by mouth 2 (two) times daily as needed.      tamsulosin HCl (TAMSULOSIN ORAL) Take 0.4 mg by mouth Daily.      aspirin 81 MG Chew Take 81 mg by mouth once daily.      atorvastatin (LIPITOR) 10 MG tablet Take 10 mg by mouth Daily.      cetirizine 10 mg Cap Take 10 mg by mouth Daily.      cyanocobalamin (VITAMIN B-12) 1000 MCG tablet Take 100 mcg by mouth once daily.         Allergies:  Review of patient's allergies indicates:   Allergen Reactions    Shellfish containing products Anaphylaxis    Bananas [banana] Other (See Comments)     Ears itch    Penicillins Other (See Comments)     Ears itch       Active Problems:  There is no problem list on file for this  patient.      Diagnostic Studies:   Latest Reference Range & Units 07/06/22 12:00   WBC 3.90 - 12.70 K/uL 6.56   RBC 4.60 - 6.20 M/uL 4.84   Hemoglobin 14.0 - 18.0 g/dL 13.9 (L)   Hematocrit 40.0 - 54.0 % 41.6   MCV 82 - 98 fL 86   MCH 27.0 - 31.0 pg 28.7   MCHC 32.0 - 36.0 g/dL 33.4   RDW 11.5 - 14.5 % 13.2   Platelets 150 - 450 K/uL 231   MPV 9.2 - 12.9 fL 11.1   Gran % 38.0 - 73.0 % 50.9   Lymph % 18.0 - 48.0 % 33.5   Mono % 4.0 - 15.0 % 11.0   Eosinophil % 0.0 - 8.0 % 3.7   Basophil % 0.0 - 1.9 % 0.6   Immature Granulocytes 0.0 - 0.5 % 0.3   Gran # (ANC) 1.8 - 7.7 K/uL 3.3   Lymph # 1.0 - 4.8 K/uL 2.2   Mono # 0.3 - 1.0 K/uL 0.7   Eos # 0.0 - 0.5 K/uL 0.2   Baso # 0.00 - 0.20 K/uL 0.04   Immature Grans (Abs) 0.00 - 0.04 K/uL 0.02   nRBC 0 /100 WBC 0   Differential Method  Automated      Latest Reference Range & Units 07/06/22 12:00   Sodium 136 - 145 mmol/L 141   Potassium 3.5 - 5.1 mmol/L 4.0   Chloride 95 - 110 mmol/L 105   CO2 23 - 29 mmol/L 30 (H)   Anion Gap 8 - 16 mmol/L 6 (L)   BUN 8 - 23 mg/dL 16   Creatinine 0.5 - 1.4 mg/dL 1.0   eGFR if non African American >60 mL/min/1.73 m^2 >60   eGFR if African American >60 mL/min/1.73 m^2 >60   Glucose 70 - 110 mg/dL 133 (H)   Calcium 8.7 - 10.5 mg/dL 9.5     EKG (3/23/22):  Normal sinus rhythm   Nonspecific T wave abnormality     24 Hour Vitals:  Temp:  [36.6 °C (97.9 °F)] 36.6 °C (97.9 °F)  Pulse:  [64] 64  Resp:  [18] 18  SpO2:  [96 %] 96 %  BP: (144)/(81) 144/81   See Nursing Charting For Additional Vitals    Pre-op Assessment    I have reviewed the Patient Summary Reports.     I have reviewed the Nursing Notes. I have reviewed the NPO Status.   I have reviewed the Medications.     Review of Systems  Anesthesia Hx:  No problems with previous Anesthesia  Denies Family Hx of Anesthesia complications.   Denies Personal Hx of Anesthesia complications.   Social:  Non-Smoker, No Alcohol Use    Hematology/Oncology:  Hematology Normal   Oncology Normal      EENT/Dental:EENT/Dental Normal   Cardiovascular:   Exercise tolerance: good Hypertension Past MI  hyperlipidemia ECG has been reviewed.  Functional Capacity good / => 4 METS    Pulmonary:  Pulmonary Normal    Renal/:  Renal/ Normal     Hepatic/GI:  Hepatic/GI Normal    Musculoskeletal:   Arthritis  R knee   Neurological:  Neurology Normal    Endocrine:   Diabetes, type 2    Dermatological:  Skin Normal    Psych:  Psychiatric Normal           Physical Exam  General: Well nourished, Cooperative, Alert and Oriented    Airway:  Mallampati: II / I  Mouth Opening: Normal  TM Distance: Normal  Tongue: Normal  Neck ROM: Normal ROM    Dental:  Dentures, Intact        Anesthesia Plan  Type of Anesthesia, risks & benefits discussed:    Anesthesia Type: Spinal  Intra-op Monitoring Plan: Standard ASA Monitors  Post Op Pain Control Plan: multimodal analgesia and peripheral nerve block  Induction:  IV  Airway Plan: Direct and Video, Post-Induction  Informed Consent: Informed consent signed with the Patient and all parties understand the risks and agree with anesthesia plan.  All questions answered. Patient consented to blood products? Yes  ASA Score: 2  Day of Surgery Review of History & Physical: H&P Update referred to the surgeon/provider.  [facility]est[facility] Plan Notes[facility]  Cleared by Cardiology (notes in media)    Ready For Surgery From Anesthesia Perspective.     .

## 2022-07-08 DIAGNOSIS — M17.11 UNILATERAL PRIMARY OSTEOARTHRITIS, RIGHT KNEE: Primary | ICD-10-CM

## 2022-07-12 ENCOUNTER — HOSPITAL ENCOUNTER (OUTPATIENT)
Dept: RADIOLOGY | Facility: HOSPITAL | Age: 62
Discharge: HOME | End: 2022-07-12
Attending: ORTHOPAEDIC SURGERY
Payer: OTHER GOVERNMENT

## 2022-07-12 DIAGNOSIS — M17.11 UNILATERAL PRIMARY OSTEOARTHRITIS, RIGHT KNEE: ICD-10-CM

## 2022-07-12 PROCEDURE — 73700 CT LOWER EXTREMITY W/O DYE: CPT | Mod: TC,RT

## 2022-07-12 PROCEDURE — 73700 CT KNEE WITHOUT CONTRAST RIGHT W/MAKO PROTOCOL: ICD-10-PCS | Mod: 26,RT,, | Performed by: RADIOLOGY

## 2022-07-12 PROCEDURE — 73700 CT LOWER EXTREMITY W/O DYE: CPT | Mod: 26,RT,, | Performed by: RADIOLOGY

## 2022-07-14 ENCOUNTER — HOSPITAL ENCOUNTER (OUTPATIENT)
Facility: HOSPITAL | Age: 62
Discharge: HOME HEALTH | End: 2022-07-15
Attending: ORTHOPAEDIC SURGERY | Admitting: ORTHOPAEDIC SURGERY
Payer: OTHER GOVERNMENT

## 2022-07-14 ENCOUNTER — ANESTHESIA (OUTPATIENT)
Dept: SURGERY | Facility: HOSPITAL | Age: 62
End: 2022-07-14
Payer: OTHER GOVERNMENT

## 2022-07-14 DIAGNOSIS — M17.11 UNILATERAL PRIMARY OSTEOARTHRITIS, RIGHT KNEE: ICD-10-CM

## 2022-07-14 DIAGNOSIS — Z01.818 PREOPERATIVE TESTING: Primary | ICD-10-CM

## 2022-07-14 DIAGNOSIS — M21.161 GENU VARUM OF RIGHT LOWER EXTREMITY: ICD-10-CM

## 2022-07-14 LAB
POCT GLUCOSE: 154 MG/DL (ref 70–110)
POCT GLUCOSE: 211 MG/DL (ref 70–110)

## 2022-07-14 PROCEDURE — 63600175 PHARM REV CODE 636 W HCPCS: Performed by: ANESTHESIOLOGY

## 2022-07-14 PROCEDURE — 01402 ANES OPN/ARTH TOT KNE ARTHRP: CPT | Performed by: ORTHOPAEDIC SURGERY

## 2022-07-14 PROCEDURE — 97161 PT EVAL LOW COMPLEX 20 MIN: CPT

## 2022-07-14 PROCEDURE — 20985 CPTR-ASST DIR MS PX: CPT | Mod: ,,, | Performed by: ORTHOPAEDIC SURGERY

## 2022-07-14 PROCEDURE — 64447 PR NERVE BLOCK INJ, ANES/STEROID, FEMORAL, INCL IMAG GUIDANCE: ICD-10-PCS | Mod: 59,RT,, | Performed by: ANESTHESIOLOGY

## 2022-07-14 PROCEDURE — 25000003 PHARM REV CODE 250: Performed by: ORTHOPAEDIC SURGERY

## 2022-07-14 PROCEDURE — 64447 NJX AA&/STRD FEMORAL NRV IMG: CPT | Mod: 59,RT,, | Performed by: ANESTHESIOLOGY

## 2022-07-14 PROCEDURE — 63600175 PHARM REV CODE 636 W HCPCS: Performed by: ORTHOPAEDIC SURGERY

## 2022-07-14 PROCEDURE — 76942 PR U/S GUIDANCE FOR NEEDLE GUIDANCE: ICD-10-PCS | Mod: 26,,, | Performed by: ANESTHESIOLOGY

## 2022-07-14 PROCEDURE — 64447 NJX AA&/STRD FEMORAL NRV IMG: CPT | Performed by: STUDENT IN AN ORGANIZED HEALTH CARE EDUCATION/TRAINING PROGRAM

## 2022-07-14 PROCEDURE — 82962 GLUCOSE BLOOD TEST: CPT | Performed by: ORTHOPAEDIC SURGERY

## 2022-07-14 PROCEDURE — 63600175 PHARM REV CODE 636 W HCPCS: Performed by: NURSE ANESTHETIST, CERTIFIED REGISTERED

## 2022-07-14 PROCEDURE — 27447 TOTAL KNEE ARTHROPLASTY: CPT | Mod: RT,,, | Performed by: ORTHOPAEDIC SURGERY

## 2022-07-14 PROCEDURE — 25000003 PHARM REV CODE 250: Performed by: STUDENT IN AN ORGANIZED HEALTH CARE EDUCATION/TRAINING PROGRAM

## 2022-07-14 PROCEDURE — 37000008 HC ANESTHESIA 1ST 15 MINUTES: Performed by: ORTHOPAEDIC SURGERY

## 2022-07-14 PROCEDURE — D9220A PRA ANESTHESIA: Mod: ANES,,, | Performed by: ANESTHESIOLOGY

## 2022-07-14 PROCEDURE — 97530 THERAPEUTIC ACTIVITIES: CPT

## 2022-07-14 PROCEDURE — D9220A PRA ANESTHESIA: ICD-10-PCS | Mod: ANES,,, | Performed by: ANESTHESIOLOGY

## 2022-07-14 PROCEDURE — 36000710: Performed by: ORTHOPAEDIC SURGERY

## 2022-07-14 PROCEDURE — 71000033 HC RECOVERY, INTIAL HOUR: Performed by: ORTHOPAEDIC SURGERY

## 2022-07-14 PROCEDURE — 63600175 PHARM REV CODE 636 W HCPCS: Performed by: STUDENT IN AN ORGANIZED HEALTH CARE EDUCATION/TRAINING PROGRAM

## 2022-07-14 PROCEDURE — 27200688 HC TRAY, SPINAL-HYPER/ ISOBARIC: Performed by: ANESTHESIOLOGY

## 2022-07-14 PROCEDURE — 71000039 HC RECOVERY, EACH ADD'L HOUR: Performed by: ORTHOPAEDIC SURGERY

## 2022-07-14 PROCEDURE — 36000711: Performed by: ORTHOPAEDIC SURGERY

## 2022-07-14 PROCEDURE — D9220A PRA ANESTHESIA: Mod: CRNA,,, | Performed by: NURSE ANESTHETIST, CERTIFIED REGISTERED

## 2022-07-14 PROCEDURE — 76942 ECHO GUIDE FOR BIOPSY: CPT | Mod: 26,,, | Performed by: ANESTHESIOLOGY

## 2022-07-14 PROCEDURE — 97116 GAIT TRAINING THERAPY: CPT

## 2022-07-14 PROCEDURE — 27200671 HC STIMUCATH NEEDLE/ CATHETER: Performed by: ANESTHESIOLOGY

## 2022-07-14 PROCEDURE — 27447 PR TOTAL KNEE ARTHROPLASTY: ICD-10-PCS | Mod: RT,,, | Performed by: ORTHOPAEDIC SURGERY

## 2022-07-14 PROCEDURE — 97165 OT EVAL LOW COMPLEX 30 MIN: CPT

## 2022-07-14 PROCEDURE — D9220A PRA ANESTHESIA: ICD-10-PCS | Mod: CRNA,,, | Performed by: NURSE ANESTHETIST, CERTIFIED REGISTERED

## 2022-07-14 PROCEDURE — 25000003 PHARM REV CODE 250: Performed by: NURSE ANESTHETIST, CERTIFIED REGISTERED

## 2022-07-14 PROCEDURE — C1776 JOINT DEVICE (IMPLANTABLE): HCPCS | Performed by: ORTHOPAEDIC SURGERY

## 2022-07-14 PROCEDURE — 37000009 HC ANESTHESIA EA ADD 15 MINS: Performed by: ORTHOPAEDIC SURGERY

## 2022-07-14 PROCEDURE — 20985 PR CPTR-ASST SURGICAL NAVIGATION IMAGE-LESS: ICD-10-PCS | Mod: ,,, | Performed by: ORTHOPAEDIC SURGERY

## 2022-07-14 PROCEDURE — 27201423 OPTIME MED/SURG SUP & DEVICES STERILE SUPPLY: Performed by: ORTHOPAEDIC SURGERY

## 2022-07-14 DEVICE — IMPLANTABLE DEVICE: Type: IMPLANTABLE DEVICE | Site: KNEE | Status: FUNCTIONAL

## 2022-07-14 DEVICE — BASEPLATE TIBIAL TRIATHLON SZ: Type: IMPLANTABLE DEVICE | Site: KNEE | Status: FUNCTIONAL

## 2022-07-14 DEVICE — COMP FEM CR BEAD SZ 6 RIGHT: Type: IMPLANTABLE DEVICE | Site: KNEE | Status: FUNCTIONAL

## 2022-07-14 RX ORDER — IBUPROFEN 200 MG
24 TABLET ORAL
Status: CANCELLED | OUTPATIENT
Start: 2022-07-14

## 2022-07-14 RX ORDER — PROCHLORPERAZINE EDISYLATE 5 MG/ML
5 INJECTION INTRAMUSCULAR; INTRAVENOUS EVERY 30 MIN PRN
Status: DISCONTINUED | OUTPATIENT
Start: 2022-07-14 | End: 2022-07-14 | Stop reason: HOSPADM

## 2022-07-14 RX ORDER — MIDAZOLAM HYDROCHLORIDE 1 MG/ML
INJECTION, SOLUTION INTRAMUSCULAR; INTRAVENOUS
Status: DISCONTINUED | OUTPATIENT
Start: 2022-07-14 | End: 2022-07-14

## 2022-07-14 RX ORDER — SODIUM CHLORIDE 9 MG/ML
INJECTION, SOLUTION INTRAVENOUS CONTINUOUS
Status: DISCONTINUED | OUTPATIENT
Start: 2022-07-14 | End: 2022-07-15 | Stop reason: HOSPADM

## 2022-07-14 RX ORDER — SODIUM CHLORIDE, SODIUM LACTATE, POTASSIUM CHLORIDE, CALCIUM CHLORIDE 600; 310; 30; 20 MG/100ML; MG/100ML; MG/100ML; MG/100ML
INJECTION, SOLUTION INTRAVENOUS CONTINUOUS
Status: DISCONTINUED | OUTPATIENT
Start: 2022-07-14 | End: 2022-07-15 | Stop reason: HOSPADM

## 2022-07-14 RX ORDER — CELECOXIB 100 MG/1
200 CAPSULE ORAL 2 TIMES DAILY
Status: DISCONTINUED | OUTPATIENT
Start: 2022-07-14 | End: 2022-07-15 | Stop reason: HOSPADM

## 2022-07-14 RX ORDER — TAMSULOSIN HYDROCHLORIDE 0.4 MG/1
0.4 CAPSULE ORAL DAILY
Status: DISCONTINUED | OUTPATIENT
Start: 2022-07-14 | End: 2022-07-15 | Stop reason: HOSPADM

## 2022-07-14 RX ORDER — PROPOFOL 10 MG/ML
VIAL (ML) INTRAVENOUS
Status: DISCONTINUED | OUTPATIENT
Start: 2022-07-14 | End: 2022-07-14

## 2022-07-14 RX ORDER — DOCUSATE SODIUM 100 MG/1
100 CAPSULE, LIQUID FILLED ORAL EVERY 12 HOURS
Status: DISCONTINUED | OUTPATIENT
Start: 2022-07-14 | End: 2022-07-15 | Stop reason: HOSPADM

## 2022-07-14 RX ORDER — SODIUM CHLORIDE, SODIUM LACTATE, POTASSIUM CHLORIDE, CALCIUM CHLORIDE 600; 310; 30; 20 MG/100ML; MG/100ML; MG/100ML; MG/100ML
INJECTION, SOLUTION INTRAVENOUS CONTINUOUS
Status: CANCELLED | OUTPATIENT
Start: 2022-07-14

## 2022-07-14 RX ORDER — SODIUM CHLORIDE 0.9 % (FLUSH) 0.9 %
10 SYRINGE (ML) INJECTION
Status: DISCONTINUED | OUTPATIENT
Start: 2022-07-14 | End: 2022-07-14 | Stop reason: HOSPADM

## 2022-07-14 RX ORDER — LIDOCAINE HYDROCHLORIDE 10 MG/ML
1 INJECTION, SOLUTION EPIDURAL; INFILTRATION; INTRACAUDAL; PERINEURAL ONCE
Status: CANCELLED | OUTPATIENT
Start: 2022-07-14 | End: 2022-07-14

## 2022-07-14 RX ORDER — OXYCODONE HYDROCHLORIDE 5 MG/1
10 TABLET ORAL EVERY 4 HOURS PRN
Status: DISCONTINUED | OUTPATIENT
Start: 2022-07-14 | End: 2022-07-15 | Stop reason: HOSPADM

## 2022-07-14 RX ORDER — METHOCARBAMOL 500 MG/1
500 TABLET, FILM COATED ORAL EVERY 6 HOURS PRN
Status: DISCONTINUED | OUTPATIENT
Start: 2022-07-14 | End: 2022-07-15 | Stop reason: HOSPADM

## 2022-07-14 RX ORDER — ROPIVACAINE HYDROCHLORIDE 5 MG/ML
INJECTION, SOLUTION EPIDURAL; INFILTRATION; PERINEURAL
Status: COMPLETED | OUTPATIENT
Start: 2022-07-14 | End: 2022-07-14

## 2022-07-14 RX ORDER — FAMOTIDINE 20 MG/1
20 TABLET, FILM COATED ORAL 2 TIMES DAILY
Status: DISCONTINUED | OUTPATIENT
Start: 2022-07-14 | End: 2022-07-15 | Stop reason: HOSPADM

## 2022-07-14 RX ORDER — POLYETHYLENE GLYCOL 3350 17 G/17G
17 POWDER, FOR SOLUTION ORAL DAILY
Status: CANCELLED | OUTPATIENT
Start: 2022-07-14

## 2022-07-14 RX ORDER — ONDANSETRON 4 MG/1
8 TABLET, ORALLY DISINTEGRATING ORAL EVERY 8 HOURS PRN
Qty: 10 TABLET | Refills: 0 | Status: SHIPPED | OUTPATIENT
Start: 2022-07-14

## 2022-07-14 RX ORDER — METHOCARBAMOL 500 MG/1
500 TABLET, FILM COATED ORAL 4 TIMES DAILY
Qty: 40 TABLET | Refills: 0 | Status: SHIPPED | OUTPATIENT
Start: 2022-07-14 | End: 2022-07-25

## 2022-07-14 RX ORDER — ACETAMINOPHEN 500 MG
1000 TABLET ORAL EVERY 8 HOURS
Status: DISCONTINUED | OUTPATIENT
Start: 2022-07-14 | End: 2022-07-15 | Stop reason: HOSPADM

## 2022-07-14 RX ORDER — DOCUSATE SODIUM 100 MG/1
100 CAPSULE, LIQUID FILLED ORAL 2 TIMES DAILY
Qty: 30 CAPSULE | Refills: 0 | Status: SHIPPED | OUTPATIENT
Start: 2022-07-14

## 2022-07-14 RX ORDER — PHENYLEPHRINE HYDROCHLORIDE 10 MG/ML
INJECTION INTRAVENOUS
Status: DISCONTINUED | OUTPATIENT
Start: 2022-07-14 | End: 2022-07-14

## 2022-07-14 RX ORDER — PREGABALIN 75 MG/1
75 CAPSULE ORAL 2 TIMES DAILY
Qty: 28 CAPSULE | Refills: 0 | Status: SHIPPED | OUTPATIENT
Start: 2022-07-14 | End: 2022-07-29

## 2022-07-14 RX ORDER — NAPROXEN SODIUM 220 MG/1
81 TABLET, FILM COATED ORAL 2 TIMES DAILY
Qty: 60 TABLET | Refills: 0 | Status: SHIPPED | OUTPATIENT
Start: 2022-07-14

## 2022-07-14 RX ORDER — ATORVASTATIN CALCIUM 10 MG/1
10 TABLET, FILM COATED ORAL DAILY
Status: DISCONTINUED | OUTPATIENT
Start: 2022-07-14 | End: 2022-07-15 | Stop reason: HOSPADM

## 2022-07-14 RX ORDER — OXYCODONE AND ACETAMINOPHEN 5; 325 MG/1; MG/1
1 TABLET ORAL EVERY 4 HOURS PRN
Status: DISCONTINUED | OUTPATIENT
Start: 2022-07-14 | End: 2022-07-15 | Stop reason: HOSPADM

## 2022-07-14 RX ORDER — PROPOFOL 10 MG/ML
VIAL (ML) INTRAVENOUS CONTINUOUS PRN
Status: DISCONTINUED | OUTPATIENT
Start: 2022-07-14 | End: 2022-07-14

## 2022-07-14 RX ORDER — ONDANSETRON 2 MG/ML
4 INJECTION INTRAMUSCULAR; INTRAVENOUS DAILY PRN
Status: DISCONTINUED | OUTPATIENT
Start: 2022-07-14 | End: 2022-07-14 | Stop reason: HOSPADM

## 2022-07-14 RX ORDER — MUPIROCIN 20 MG/G
OINTMENT TOPICAL
Status: DISCONTINUED | OUTPATIENT
Start: 2022-07-14 | End: 2022-07-14 | Stop reason: HOSPADM

## 2022-07-14 RX ORDER — ROPIVACAINE/EPI/CLONIDINE/KET 2.46-0.005
SYRINGE (ML) INJECTION
Status: DISCONTINUED | OUTPATIENT
Start: 2022-07-14 | End: 2022-07-14 | Stop reason: HOSPADM

## 2022-07-14 RX ORDER — HYDROMORPHONE HYDROCHLORIDE 2 MG/ML
0.2 INJECTION, SOLUTION INTRAMUSCULAR; INTRAVENOUS; SUBCUTANEOUS EVERY 5 MIN PRN
Status: DISCONTINUED | OUTPATIENT
Start: 2022-07-14 | End: 2022-07-14 | Stop reason: HOSPADM

## 2022-07-14 RX ORDER — IBUPROFEN 200 MG
16 TABLET ORAL
Status: CANCELLED | OUTPATIENT
Start: 2022-07-14

## 2022-07-14 RX ORDER — FENTANYL CITRATE 50 UG/ML
INJECTION, SOLUTION INTRAMUSCULAR; INTRAVENOUS
Status: DISCONTINUED | OUTPATIENT
Start: 2022-07-14 | End: 2022-07-14

## 2022-07-14 RX ORDER — CEFAZOLIN SODIUM 2 G/50ML
2 SOLUTION INTRAVENOUS
Status: COMPLETED | OUTPATIENT
Start: 2022-07-14 | End: 2022-07-14

## 2022-07-14 RX ORDER — GLUCAGON 1 MG
1 KIT INJECTION
Status: CANCELLED | OUTPATIENT
Start: 2022-07-14

## 2022-07-14 RX ORDER — OXYCODONE HYDROCHLORIDE 5 MG/1
5 TABLET ORAL EVERY 4 HOURS PRN
Qty: 50 TABLET | Refills: 0 | Status: SHIPPED | OUTPATIENT
Start: 2022-07-14 | End: 2022-07-25 | Stop reason: SDUPTHER

## 2022-07-14 RX ORDER — ROPIVACAINE/EPI/CLONIDINE/KET 2.46-0.005
SYRINGE (ML) INJECTION ONCE
Status: DISCONTINUED | OUTPATIENT
Start: 2022-07-14 | End: 2022-07-15 | Stop reason: HOSPADM

## 2022-07-14 RX ORDER — SODIUM CHLORIDE 0.9 % (FLUSH) 0.9 %
2 SYRINGE (ML) INJECTION
Status: CANCELLED | OUTPATIENT
Start: 2022-07-14

## 2022-07-14 RX ORDER — CELECOXIB 200 MG/1
200 CAPSULE ORAL 2 TIMES DAILY
Qty: 14 CAPSULE | Refills: 0 | Status: SHIPPED | OUTPATIENT
Start: 2022-07-14 | End: 2022-07-22

## 2022-07-14 RX ORDER — EPHEDRINE SULFATE 50 MG/ML
INJECTION, SOLUTION INTRAVENOUS
Status: DISCONTINUED | OUTPATIENT
Start: 2022-07-14 | End: 2022-07-14

## 2022-07-14 RX ORDER — TRANEXAMIC ACID 100 MG/ML
1000 INJECTION, SOLUTION INTRAVENOUS
Status: COMPLETED | OUTPATIENT
Start: 2022-07-14 | End: 2022-07-14

## 2022-07-14 RX ORDER — ACETAMINOPHEN 500 MG
1000 TABLET ORAL
Status: CANCELLED | OUTPATIENT
Start: 2022-07-14 | End: 2022-07-14

## 2022-07-14 RX ORDER — ONDANSETRON 2 MG/ML
4 INJECTION INTRAMUSCULAR; INTRAVENOUS EVERY 12 HOURS PRN
Status: CANCELLED | OUTPATIENT
Start: 2022-07-14

## 2022-07-14 RX ORDER — LIDOCAINE HYDROCHLORIDE 20 MG/ML
INJECTION INTRAVENOUS
Status: DISCONTINUED | OUTPATIENT
Start: 2022-07-14 | End: 2022-07-14

## 2022-07-14 RX ORDER — METOCLOPRAMIDE HYDROCHLORIDE 5 MG/ML
5 INJECTION INTRAMUSCULAR; INTRAVENOUS EVERY 6 HOURS PRN
Status: CANCELLED | OUTPATIENT
Start: 2022-07-14

## 2022-07-14 RX ORDER — CEFAZOLIN SODIUM 2 G/50ML
2 SOLUTION INTRAVENOUS
Status: DISCONTINUED | OUTPATIENT
Start: 2022-07-14 | End: 2022-07-14

## 2022-07-14 RX ORDER — TRAMADOL HYDROCHLORIDE 50 MG/1
50 TABLET ORAL EVERY 4 HOURS PRN
Status: DISCONTINUED | OUTPATIENT
Start: 2022-07-14 | End: 2022-07-15 | Stop reason: HOSPADM

## 2022-07-14 RX ORDER — NAPROXEN SODIUM 220 MG/1
81 TABLET, FILM COATED ORAL 2 TIMES DAILY
Status: DISCONTINUED | OUTPATIENT
Start: 2022-07-14 | End: 2022-07-15 | Stop reason: HOSPADM

## 2022-07-14 RX ORDER — BISACODYL 10 MG
10 SUPPOSITORY, RECTAL RECTAL DAILY PRN
Status: CANCELLED | OUTPATIENT
Start: 2022-07-14

## 2022-07-14 RX ADMIN — EPHEDRINE SULFATE 20 MG: 50 INJECTION INTRAVENOUS at 10:07

## 2022-07-14 RX ADMIN — HYDROMORPHONE HYDROCHLORIDE 0.2 MG: 2 INJECTION, SOLUTION INTRAMUSCULAR; INTRAVENOUS; SUBCUTANEOUS at 01:07

## 2022-07-14 RX ADMIN — OXYCODONE AND ACETAMINOPHEN 1 TABLET: 5; 325 TABLET ORAL at 12:07

## 2022-07-14 RX ADMIN — SODIUM CHLORIDE, SODIUM LACTATE, POTASSIUM CHLORIDE, AND CALCIUM CHLORIDE: .6; .31; .03; .02 INJECTION, SOLUTION INTRAVENOUS at 08:07

## 2022-07-14 RX ADMIN — PHENYLEPHRINE HYDROCHLORIDE 100 MCG: 10 INJECTION INTRAVENOUS at 11:07

## 2022-07-14 RX ADMIN — CEFAZOLIN SODIUM 2 G: 1 POWDER, FOR SOLUTION INTRAMUSCULAR; INTRAVENOUS at 08:07

## 2022-07-14 RX ADMIN — FENTANYL CITRATE 50 MCG: 50 INJECTION, SOLUTION INTRAMUSCULAR; INTRAVENOUS at 09:07

## 2022-07-14 RX ADMIN — PREGABALIN 75 MG: 25 CAPSULE ORAL at 12:07

## 2022-07-14 RX ADMIN — ATORVASTATIN CALCIUM 10 MG: 10 TABLET, FILM COATED ORAL at 05:07

## 2022-07-14 RX ADMIN — OXYCODONE 10 MG: 5 TABLET ORAL at 03:07

## 2022-07-14 RX ADMIN — ASPIRIN 81 MG CHEWABLE TABLET 81 MG: 81 TABLET CHEWABLE at 12:07

## 2022-07-14 RX ADMIN — DOCUSATE SODIUM 100 MG: 100 CAPSULE, LIQUID FILLED ORAL at 12:07

## 2022-07-14 RX ADMIN — EPHEDRINE SULFATE 10 MG: 50 INJECTION INTRAVENOUS at 10:07

## 2022-07-14 RX ADMIN — MEPIVACAINE HYDROCHLORIDE 2.5 ML: 20 INJECTION, SOLUTION EPIDURAL; INFILTRATION at 09:07

## 2022-07-14 RX ADMIN — SODIUM CHLORIDE, SODIUM LACTATE, POTASSIUM CHLORIDE, AND CALCIUM CHLORIDE: .6; .31; .03; .02 INJECTION, SOLUTION INTRAVENOUS at 11:07

## 2022-07-14 RX ADMIN — ASPIRIN 81 MG CHEWABLE TABLET 81 MG: 81 TABLET CHEWABLE at 09:07

## 2022-07-14 RX ADMIN — PREGABALIN 75 MG: 25 CAPSULE ORAL at 09:07

## 2022-07-14 RX ADMIN — CELECOXIB 200 MG: 100 CAPSULE ORAL at 09:07

## 2022-07-14 RX ADMIN — FAMOTIDINE 20 MG: 20 TABLET ORAL at 12:07

## 2022-07-14 RX ADMIN — PROPOFOL 75 MCG/KG/MIN: 10 INJECTION, EMULSION INTRAVENOUS at 09:07

## 2022-07-14 RX ADMIN — PHENYLEPHRINE HYDROCHLORIDE 100 MCG: 10 INJECTION INTRAVENOUS at 09:07

## 2022-07-14 RX ADMIN — TAMSULOSIN HYDROCHLORIDE 0.4 MG: 0.4 CAPSULE ORAL at 05:07

## 2022-07-14 RX ADMIN — PHENYLEPHRINE HYDROCHLORIDE 100 MCG: 10 INJECTION INTRAVENOUS at 10:07

## 2022-07-14 RX ADMIN — PROPOFOL 30 MG: 10 INJECTION, EMULSION INTRAVENOUS at 09:07

## 2022-07-14 RX ADMIN — CEFAZOLIN SODIUM 2 G: 2 SOLUTION INTRAVENOUS at 09:07

## 2022-07-14 RX ADMIN — ROPIVACAINE HYDROCHLORIDE 20 ML: 5 INJECTION, SOLUTION EPIDURAL; INFILTRATION; PERINEURAL at 09:07

## 2022-07-14 RX ADMIN — TRANEXAMIC ACID 1000 MG: 100 INJECTION, SOLUTION INTRAVENOUS at 09:07

## 2022-07-14 RX ADMIN — FAMOTIDINE 20 MG: 20 TABLET ORAL at 09:07

## 2022-07-14 RX ADMIN — CELECOXIB 200 MG: 100 CAPSULE ORAL at 12:07

## 2022-07-14 RX ADMIN — METHOCARBAMOL 500 MG: 500 TABLET ORAL at 12:07

## 2022-07-14 RX ADMIN — MUPIROCIN: 20 OINTMENT TOPICAL at 08:07

## 2022-07-14 RX ADMIN — METHOCARBAMOL 500 MG: 500 TABLET ORAL at 06:07

## 2022-07-14 RX ADMIN — FENTANYL CITRATE 50 MCG: 50 INJECTION, SOLUTION INTRAMUSCULAR; INTRAVENOUS at 10:07

## 2022-07-14 RX ADMIN — MIDAZOLAM HYDROCHLORIDE 2 MG: 1 INJECTION, SOLUTION INTRAMUSCULAR; INTRAVENOUS at 09:07

## 2022-07-14 RX ADMIN — SODIUM CHLORIDE: 0.9 INJECTION, SOLUTION INTRAVENOUS at 08:07

## 2022-07-14 RX ADMIN — TRANEXAMIC ACID 1000 MG: 100 INJECTION, SOLUTION INTRAVENOUS at 11:07

## 2022-07-14 RX ADMIN — LIDOCAINE HYDROCHLORIDE 60 MG: 20 INJECTION, SOLUTION INTRAVENOUS at 09:07

## 2022-07-14 RX ADMIN — DOCUSATE SODIUM 100 MG: 100 CAPSULE, LIQUID FILLED ORAL at 09:07

## 2022-07-14 RX ADMIN — ACETAMINOPHEN 1000 MG: 500 TABLET ORAL at 09:07

## 2022-07-14 NOTE — ANESTHESIA PROCEDURE NOTES
Peripheral Block    Patient location during procedure: pre-op   Block not for primary anesthetic.  Reason for block: at surgeon's request and post-op pain management   Post-op Pain Location: Right knee   Start time: 7/14/2022 9:11 AM  Timeout: 7/14/2022 9:10 AM   End time: 7/14/2022 9:18 AM    Staffing  Authorizing Provider: Mauri Bowman MD  Performing Provider: Justin Mccoy MD    Preanesthetic Checklist  Completed: patient identified, IV checked, site marked, risks and benefits discussed, surgical consent, monitors and equipment checked, pre-op evaluation and timeout performed  Peripheral Block  Patient position: sitting  Prep: ChloraPrep  Patient monitoring: heart rate, cardiac monitor, continuous pulse ox, continuous capnometry and frequent blood pressure checks  Block type: adductor canal  Laterality: right  Injection technique: single shot  Needle  Needle type: Stimuplex   Needle gauge: 22 G  Needle length: 4 in  Needle localization: ultrasound guidance   -ultrasound image captured on disc.  Assessment  Injection assessment: negative aspiration, negative parasthesia and local visualized surrounding nerve  Paresthesia pain: none  Heart rate change: no  Slow fractionated injection: yes  Pain Tolerance: comfortable throughout block and no complaints  Medications:    Medications: ropivacaine (NAROPIN) injection 0.5% - Perineural   20 mL - 7/14/2022 9:18:00 AM
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
## 2022-07-14 NOTE — PLAN OF CARE
Problem: Physical Therapy  Goal: Physical Therapy Goal  Description: Goals to be met by: 7/28/22    Patient will increase functional independence with mobility by performing:    Supine to sit with [place]  Sit to supine with [place]  Sit to stand transfer with Modified [place]  Bed to chair transfer with Modified [place] using Rolling Walker  Gait  x 150 feet with Modified [place] using Rolling Walker.   Lower extremity exercise program x10 reps per handout, with independence    Outcome: Ongoing, Progressing   Pt is AAOx4 and is currently: SV c supine>sit, CGA c sit>stand, bed>chair, and ambulation ( ~ 162' c RW).  R Knee AROM was approximately 5-85 deg. Acute services are needed c PT recommending HHPT upon d/c.

## 2022-07-14 NOTE — ANESTHESIA PROCEDURE NOTES
Spinal    Diagnosis: Right knee OA  Patient location during procedure: OR  Start time: 7/14/2022 9:41 AM  Timeout: 7/14/2022 9:40 AM  End time: 7/14/2022 9:51 AM    Staffing  Authorizing Provider: Mauri Bowman MD  Performing Provider: Mauri Bowman MD    Preanesthetic Checklist  Completed: patient identified, IV checked, site marked, risks and benefits discussed, surgical consent, monitors and equipment checked, pre-op evaluation and timeout performed  Spinal Block  Patient position: sitting  Prep: ChloraPrep  Patient monitoring: heart rate, cardiac monitor, continuous pulse ox, continuous capnometry and frequent blood pressure checks  Approach: midline  Location: L4-5  Injection technique: single shot  CSF Fluid: clear free-flowing CSF  Needle  Needle type: pencil-tip   Needle gauge: 25 G  Needle length: 4 in  Additional Documentation: no paresthesia on injection  Needle localization: anatomical landmarks  Assessment  Ease of block: easy  Patient's tolerance of the procedure: comfortable throughout block and no complaints  Medications:    Medications: mepivacaine (CARBOCAINE) injection 20 mg/mL (2%) - Intrathecal   2.5 mL - 7/14/2022 9:51:00 AM

## 2022-07-14 NOTE — PLAN OF CARE
Problem: Occupational Therapy  Goal: Occupational Therapy Goal  Description: Goals to be met by: 7/28/22    Patient will increase functional independence with ADLs by performing:    LE Dressing with Modified [place] and use of AE as needed for safety.   Grooming while standing at sink with Modified [place].  Toileting from toilet with Modified [place] for hygiene and clothing management.   Supine to sit with Modified [place].  Step transfer with Modified [place]  Toilet transfer to toilet with Modified [place].    Outcome: Ongoing, Progressing

## 2022-07-14 NOTE — PT/OT/SLP PROGRESS
Occupational Therapy      Patient Name:  Tiki Willoughby   MRN:  [id number]    1406: Per nurse, pt will be transferred to room 301. Will follow-up once pt is in room.    7/14/2022

## 2022-07-14 NOTE — PLAN OF CARE
Discharge Orders: Home [place]         Expected Discharge Date: POD1    Diagnoses:  Post-op knee replacement    Patient is homebound due to:   Pt requires home health services due to taxing effort to leave the home as a result of immobility from Post-op knee replacement    Weight Bearing Status:   full weight bearing: Progress to cane as able.  Set up for outpatient PT as soon as able after 2 weeks, once patient is MOD I with cane.    Physical Therapy:   3 times a week for 2 weeks (Call for further orders beyond 2 weeks)  - Ambulate with a rolling walker  - Progress to cane  - Instruct on ROM and strengthening of knee    Aide to provide assistance with personal care and  ADLs 2 times a week for 2 weeks    Wound Care:   Surgical dressing change: Starting on  post op day 7-10, dressing can be moved. Incision is glued. Protective dressing should be placed with island dressing or apply gauze and cover with tegaderm.  Pt may shower if surgical dressing is waterproof, once removed on POD7 can shower but protect surgical dressing from getting wet by using press and seal saranwrap or garbage bag. Removal and replacement of dressing after shower only needed if incision is suspected to have gotten wet during shower. No soaking in the tub or hot tub use for 6 weeks.    · Cold therapy/Ice: Encouraged at least 20 minutes 2-3 times daily or more if desired.  Incision must be kept waterproof while icing.  · Wear TEDS Bilateral Thigh High Stockings for 3 weeks. Lucio hose x 3 weeks. Ok to remove lucio hose 1-2 hours/day max if desired.       Contact:  Please contact [phone] with concerns.         BLOOD THINNER:    If sent home on Lovenox: 28 days post-op for TKR/JAVON  If sent home home on ASA: 81mg   BID x 4 weeks   If on Plavix, ok to stop hospital prescribed blood thinner and restart Plavix on POD5 after surgery    Once finished with prescribed blood thinner, patients can return to pre-surgical ASA dosage if they took ASA before  surgery.       Outpatient Therapy: [facility] is preference (to begin 2 weeks post op)  [address] [facility]  [place] [id number]      DME:  - Per PT/OT recommendation

## 2022-07-14 NOTE — OP NOTE
OPERATIVE NOTE     PATIENT NAME: Tiki Willoughby   MRN: [id number]      Surgery Date:7/14/22    Surgeon(s) and Assistant(s): Stevo Kohler MD. BOY Heller [facility]     Procedure(s): right Primary Total Knee Arthroplasty with BETH     BMI:  Body mass index is 34.49 kg/m².      Anesthesia:  Spinal     Attestation:   I was present for all of the critical portions of the operation and performed all critical portions.  The medical assistant performed the superficial closure under supervision, and assisted during the operation. I was immediately available for the duration of the entire case.      Preoperative Diagnosis:  right  Knee Arthritis     Postoperative Diagnosis: Same     Findings:  See Full Operative Report    Complications: None     EBL: 200cc     Implants:   Implant Name Type Inv. Item Serial No.  Lot No. LRB No. Used Action   COMP FEM CR BEAD SZ 6 RIGHT - [id number]  COMP FEM CR BEAD SZ 6 RIGHT  [employer].  Right 1 Implanted   BASEPLATE TIBIAL TRIATHLON SZ - [id number]  BASEPLATE TIBIAL TRIATHLON SZ  [employer].  Right 1 Implanted   Triathlon Tibial Bearing Insert -CS SZ7    KATHY [id number] Right 1 Implanted   Triathlon Tritanium Asymmetric Patella SZ A40MM x 11MM    KATHY  Right 1 Implanted       Drains: None     Problem List:   Problem List Items Addressed This Visit    None     Visit Diagnoses     Preoperative testing    -  Primary    Relevant Orders    POCT glucose    Unilateral primary osteoarthritis, right knee        Relevant Orders    Place in Outpatient (Completed)    Vital signs    Clip and Prep Other (please specifiy) (Surgical Site)    Chlorohexidine Gluconate Bath    Place in Outpatient - Extended Recovery    Polar Ice to site continuously through surgical dressing    OT evaluate and treat    PT evaluate and treat    X-Ray Knee 1 or 2 View Right    Genu varum of right lower extremity                 OPERATIVE INDICATIONS: The patient has a long history of  progressive right knee pain, arthritis, and degeneration. They has developed deformity in the knee from predominantly wear and bone loss. Non-operative treatment and injections have been attempted, but have not improved or controlled the symptoms and pain that occurs during normal daily activities. Knee motion has also become limited and is restricting the patient. A total knee arthroplasty was recommended. The risks, benefits and potential complications of the arthroplasty surgery were discussed with the patient in detail. Specific details of the surgical procedure, hospitalization, recovery, rehabilitation, and long-term precautions were also presented. Pre-operative teaching was provided. Implant/prosthesis selection was outlined, and the many options available were explained; the final choice will be made at the time of the procedure to match the anatomy and condition of the bone, ligaments, tendons, and muscles.      The patient was seen by Internal Medicine/Anesthesia for pre-operative optimization, and risk assessment. Mamie-operative blood management and the potential for blood transfusion were discussed with risks and options clearly outlined. We discussed the risks of the procedure in detail, which included but is not limited to infection, bleeding, scarring, injury to blood vessels, nerves, muscular structures. We discussed specifically fracture and arthrofibrotic complications. Understanding of all topics was conveyed to me by the patient pre-operatively, and patient consent was given to proceed with the total knee replacement.      OPERATIVE PROCEDURE:  The patient was identified and brought into the Operating Room by the anesthesia and nursing teams. Anesthesia was successfully performed with spinal. The patient was then positioned supine on the operating room table, and all bony prominences were padded.  Intravenous antibiotic prophylaxis with Cefazolin dosing was confirmed. A tourniquet was applied to  the upper thigh. A full knee exam was done after anesthesia was in full effect.  The leg was prepped and draped in the usual sterile fashion.  A surgical time-out was performed immediately preceding the incision with all personnel in the operating room; the patient identity was again confirmed, the knee-surgical site and extremity were identified and confirmed, X-rays were reviewed, and availability of the appropriate surgical equipment was established. No tourniquet was used. Knee was exposed using a limited anterior-midline skin incision. 1g of TXA was administed. Dissection was carried down through skin and subcutaneous tissue to the extensor mechanism with a scalpel.  A median parapatellar arthrotomy was made to enter the knee space sharply. A large amount of normal appearing joint fluid was encountered and suctioned.  The synovium was thickened, hypertrophic, and inflamed. A partial synovectomy was performed for exposure, and the medial and lateral gutters were cleared of scar and synovial reflections. The superficial medial collateral ligament was carefully elevated off. The patellar synovial reflections were released and the patella exposed to reveal wear through the articular surface. The trochlea demonstrated similar wear. Attention was then turned to the patella, it was measured and the posterior 9-10 mm was resected leaving a healthy remnant with greater than 11mm thickness.  The patella was then subluxed laterally. The femoral and tibial arrays were placed as well as the checkpoints.     The knee was then flexed up to 90 degrees.  Assessment of the knee joint revealed severe end-stage articular damage with no remaining medial/lateral weight bearing cartilage.  The compartment was severely eburnated with bone loss on the posterior tibia and posterior femoral condyles, resulting in the varus deformity.  The anterior cruciate ligament was found to be functionally compromised and it was excised. Assessment  of the soft tissues were made utilizing 1-6 mm feeler gauges in flexion and extension.  There was a large flexion-extension mismatch so the PCL was removed. Changes to the preoperative plan were then carried out.     Reassessment of the soft tissue balance was carried out to confirm equal tension in flexion, extension, and medial/lateral balance. The robotic arm was then utilized to execute the plan. We began with the distal femur and chamfer cut. Then blade was swtiched and the remaining femur cuts were excuted. Then the tibia was cut. Soft tissue structures were protected with Z-retractors, berhaneawat. Osteophytes were then further debrided from the femur and the tibia. Degenerative meniscal remnants were excised medially and laterally. We cleaned up posterior osteophytes with curved osteotome and currette. A portion of the pain cocktail was injected into the posterior capsule. Trailing was then initated. Femur trial was placed, then the knee was then brought to extension and the appropriate sized tibial spacer block was placed. This brought the knee to full extension, mid flexion, and 130 degrees of flexion with excellent medial lateral balance. The tray was rotated so that its midpoint was at the junction of middle one-third and lateral two-thirds of the tibial tubercle. Punches and pins fixed the trial in this position. Check points and arrays were removed. The patella was sized with the asymmetric guide, and drill holes were made. A trial button was placed and tracking of the patella and the entire knee trial was excellent; range of motion was excellent. No instability.  Patella tracked midline.      Punches and drills were then placed through the trials to accommodate the final implants.  All trials were removed.  The wound was copiously lavaged with a pulse irrigation/suction system. This included a solution of dilute betadine. The posterior recess of the knee and areas of known bleeding were treated with the  electrocautery to reduce post-operative bleeding. The cut bone surfaces were then irrigated again, suctioned, and dried. The final implants were placed, tibia followed by femur followed by patella. The final CS polyethylene was impacted into place. This was a press-fit design. The tourniquet was released and no excessive bleeding was encountered. Synovial bleeding was further treated with the electrocautery.  The wound was again irrigated. The extensor mechanism and capsule was then anatomically closed with Stratafix suture.  Knee stability and range of motion with the capsule closed was excellent, and range of motion was 0 to 130 without excessive stress on the repair. Instrument and sponge count was completed and confirmed correct. Deep and superficial subcutaneous tissue was closed with interrupted 2-0 Vicryl suture. A running 4-0 Monocryl subcuticular stitch was used to re-approximate the skin edges. Dermabond adhesive was applied.  A sterile silver impregnated dressing was placed.  PAS stockings were placed.  The patient tolerated the procedure, was transferred from the operating room table to a hospital bed, and taken to Recovery/PACU in stable condition.     PAIN COCKTAIL: VALERI Formula     POST OPERATIVE PLAN:  Patient will be transferred to the floor once in stable condition and after radiographs confirm no immediate complications. The patient will be treated with multimodal pain management protocols. They will be placed on anticoagulation of ASA 81mg to start on POD1. The patient will be weight bearing as tolerated. They will work with physical therapy, have a graduated diet, and once medially stable and safe for home can be discharged. Patient will follow up with me 3 weeks post operatively. Dressing should be removed by patient 7 days post operatively.    IMPLANTS:  [employer] Triathlon  Femur: #6 CR, pressfit  Tibia: #7, pressfit  Poly: 13mm CS  Patella: 40mm, pressfit

## 2022-07-14 NOTE — PT/OT/SLP EVAL
Physical Therapy Evaluation    Patient Name:  Tiki Willoughby   MRN:  [id number]    Recommendations:     Discharge Recommendations:  home health PT   Discharge Equipment Recommendations:  (unsure if pt has a standard vs RW c spouse bringing tomorrow)   Barriers to discharge: None    Assessment:     Tiki Willoughby is a 62 y.o. male admitted with a medical diagnosis of <principal problem not specified>.  He presents with the following impairments/functional limitations:  weakness, pain, impaired functional mobilty, gait instability, impaired skin, decreased lower extremity function, decreased ROM. Pt is AAOx4 and is currently: SV c supine>sit, CGA c sit>stand, bed>chair, and ambulation ( ~ 162' c RW).  R Knee AROM was approximately 5-85 deg. Acute services are needed c PT recommending HHPT upon d/c.     Rehab Prognosis: Good; patient would benefit from acute skilled PT services to address these deficits and reach maximum level of function.    Recent Surgery: Procedure(s) (LRB):  ARTHROPLASTY, KNEE, TOTAL. 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   Pain/Comfort:  · Pain Rating 1: 5/10  · Location - Side 1: Right  · Location 1: knee  · Pain Addressed 1: Reposition, Cessation of Activity, Distraction, Pre-medicate for activity  · Pain Rating Post-Intervention 1: 3/10    Patients cultural, spiritual, Holiness conflicts given the current situation: no    Living Environment:  Pt lives c spouse in [facility] c threshold to enter. Bathroom is tub/shower combo c shower chair.   Prior to admission, patients level of function was mod I for  ambulation c pt using SPC.  Equipment used at home: cane, straight, bedside commode, shower chair.    Upon discharge, patient will have assistance from spouse.    Objective:     Communicated with nurseBob prior to session.  Patient found HOB elevated with cryotherapy, peripheral IV (ace bandage to surgical limb)  upon PT entry to room.    General Precautions: Standard, fall   Orthopedic Precautions:N/A   Braces: N/A  Respiratory Status: Room air    Exams:  · Cognitive Exam:  Patient is oriented to Person, Place, Time and Situation  · Gross Motor Coordination:  Impaired 2/2 to weakness and pain  · Postural Exam:  Patient presented with the following abnormalities: -       Rounded shoulders  · Sensation:    · -       Intact  · Skin Integrity/Edema:      · -       Skin integrity: Visible skin intact  · RLE ROM: WFL  · RLE Strength: grossly 3+/5  · LLE ROM: WFL  · LLE Strength: Deficits: grossly 4+/5    Functional Mobility:  · Bed Mobility:     · Scooting: to EOB while seated supervision  · Supine to Sit: supervision  · Transfers:  Sit to Stand: from bed surface  contact guard assistance with rolling walker  · Bed to Chair: contact guard assistance with  rolling walker  using  Step Transfer  · Gait: ~ 162' CGA c RW. Pt demonstrated decrease epifanio, R heel strike upon IC c step through pattern. PT educated pt on maintaining eyes open due to pt wanting to close while performing activity. No LOB occured.   · Balance: SV c static sitting balance, CGA c static standing balance.     Therapeutic Activities and Exercises:  Pt educated on role of PT and PT POC. Pt verbalized understanding.   Daily orientation provided  Pt educated on the effects of bed rest and the importance of OOB activity. Pt encouraged to sit UIC majority of day as tolerated and continue daily ambulation with nursing assist. Pt verbalized understanding.  Educated on cryotherapy machine   Pt oriented to call bell and instructed to call for staff  assist with standing tasks/transfers. Pt verbalized understanding.   Discussed d/c recs with pt and his spouse, who verbalized preference to return home with HH upon d/c.    AM-PAC 6 CLICK MOBILITY  Total Score:20     Patient left reclined in chair with all lines intact, call button in reach, cryotherapy re attached, RN notified and spouse present.    GOALS:   Multidisciplinary Problems     Physical Therapy Goals        Problem: Physical Therapy    Goal Priority Disciplines Outcome Goal Variances Interventions   Physical Therapy Goal     PT, PT/OT Ongoing, Progressing     Description: Goals to be met by: 7/28/22    Patient will increase functional independence with mobility by performing:    Supine to sit with [place]  Sit to supine with [place]  Sit to stand transfer with Modified [place]  Bed to chair transfer with Modified [place] using Rolling Walker  Gait  x 150 feet with Modified [place] using Rolling Walker.   Lower extremity exercise program x10 reps per handout, with independence                     History:     Past Medical History:   Diagnosis Date    Arthritis     Diabetes mellitus     History of respiratory tract infection     Hypertension     Obesity (BMI 30-39.9)     Obesity (BMI 30-39.9)        Past Surgical History:   Procedure Laterality Date    JOINT REPLACEMENT      left knee       Time Tracking:     PT Received On:    PT Start Time: 1616     PT Stop Time: 1639  PT Total Time (min): 23 min     Billable Minutes: Evaluation 15 min  and Gait Training 8 min (co treat c OT)      07/14/2022

## 2022-07-14 NOTE — DISCHARGE INSTRUCTIONS
Post-Operative Discharge Instructions: Dr. Kohler    Physical Therapy  You will have home health/physical therapy working with you 2-3x a week for the first two weeks. After this, it will be necessary to begin formal outpatient physical therapy for an additional 2 weeks for hip replacements and about 4-6 weeks for knee replacements.   Knee replacements can get stiff and as such the post operative therapy is critical after a knee replacement. Range of motion exercises are not limited to therapy sessions and should be done every 1-2 hours on your own.   Hip replacements your therapy for the first month is mostly walking and gradually returning to activities as tolerated. However, you will have to wean off assistive devices and maintain hip precautions for 6 weeks post operatively.     Pain Management  Some degree of pain is normal and expected. You will improve gradually as your muscles become stronger and healing continues. This speed of recovery is different for every patient and you should not compare your recovery to another friend or relative.   You will be discharged with pain medications. You should wean off of these as tolerated by 4-6 weeks. but it is expected you will need them in the immediate post operative period and for therapy.   Pain medications can have common side effects of constipation which you should take a laxative, nausea which you should take medication with food, itching which can be alleviated with Benadryl, and confusion which you should stop taking pain medications and call our office if this occurs.   Be aware of your ingestion of Tylenol if your pain medication has this in it, you should not exceed 3000mg of Acetaminophen as this can damage your liver.   If you require a pain medication refill, you will need to contact our office at least 3 days in advance to prescription running out. Prescriptions cannot be called into a pharmacy. You will need to  a prescription in one of our  office locations depending on our clinic availability.     Swelling  You will have swelling of the post operative leg. Swelling may get worse with activity. It will likely get worse in the 2-3 days after discharge from the hospital. Typically swelling is correlated to pain. It can be helpful to elevate your extremity above the level of your heart and consistent use of ice. Elevating your extremity should not be done as to violate your hip precautions after a hip replacement, and no pillows should be placed under the knee after knee replacement surgery.   You will have compression stockings that should remain on for 3 weeks following surgery. They should only be removed for hygiene purposes. These will help with the swelling.       Anticoagulation  You will be placed on a blood thinner to prevent blood clots. You will need to take this as directed.        Wound Care  Your dressing should come off after 7 days. You may shower over this dressing but it should be covered or kept dry (skinny wrap or garbage bag). Once it is removed, you may leave the wound open to air as so long there is no drainage. You can continue to shower but NO scrubbing the incision, should pat dry. NO soaking the wound in a bathtub, hot tub, pool, ocean etc. for at least 6 weeks after surgery. Your incision will likely be glued on the skin and no sutures should need to be removed post operatively.  You can begin putting on Vitamin E based lotions on the wound around 6-8 weeks post operatively when there is no remaining scabbing of the incision.   If there is any drainage post operatively you should contact our office immediately    Antibiotics  You will need to be placed on prophylactic antibiotics prior to any dental procedure or cleanings, any colonoscopy, cystoscopy, prostate or bladder surgery, kidney surgery or endoscopy. This will avoid risk of subsequent infection of your replacement. We prefer Amoxicillin 2g one hour prior to procedure.  This can be given by our office or your dentist. Although controversial, we prefer lifetime dental prophylaxis.     Other Considerations  No anti-inflammatories should be used for 3 weeks following surgery  No driving for about 2-4 weeks following surgery on the left leg and about 4-6 weeks after surgery on the right leg. You will need to be off pain medications completely. You will need to be able to perform evasive driving maneuvers without hesitation.    You can fly about 2 weeks post-operatively. However, we may recommend alterative blood thinners if this will take place.   Return to work is patient specific. If a desk job, it may be 2-4 weeks for motivated individuals. Patients in strenuous or physical jobs may be out for 12 weeks.     Follow-up appointments  Your first post operative visit will be about 3 weeks after surgery. You will have x-rays at this appointment  Your second post operative visit will be about 3 months after surgery. This will be for a clinical check only unless a reason arises for an x-ray  Your third post operative visit will be about 1 year after surgery. X-rays are taken at this time.

## 2022-07-14 NOTE — TRANSFER OF CARE
Anesthesia Transfer of Care Note    Patient: Tiki Willoughby    Procedure(s) Performed: Procedure(s) (LRB):  ARTHROPLASTY, KNEE, TOTAL. BETH (Right)    Patient location: PACU    Anesthesia Type: MAC and spinal    Transport from OR: Transported from OR on room air with adequate spontaneous ventilation    Post pain: adequate analgesia    Post assessment: no apparent anesthetic complications and tolerated procedure well    Post vital signs: stable    Level of consciousness: awake, alert and oriented    Nausea/Vomiting: no nausea/vomiting    Complications: none    Transfer of care protocol was followed      Last vitals:   Visit Vitals  /61 (BP Location: Right arm, Patient Position: Lying)   Pulse 94   Temp 36.5 °C (97.7 °F) (Oral)   Resp 16   Wt 110.6 kg (243 lb 13 oz)   SpO2 97%   BMI 34.49 kg/m²

## 2022-07-15 VITALS
DIASTOLIC BLOOD PRESSURE: 78 MMHG | TEMPERATURE: 99 F | HEART RATE: 80 BPM | HEIGHT: 70 IN | SYSTOLIC BLOOD PRESSURE: 162 MMHG | RESPIRATION RATE: 20 BRPM | BODY MASS INDEX: 34.79 KG/M2 | OXYGEN SATURATION: 96 % | WEIGHT: 243 LBS

## 2022-07-15 DIAGNOSIS — Z96.651 STATUS POST RIGHT KNEE REPLACEMENT: Primary | ICD-10-CM

## 2022-07-15 LAB — POCT GLUCOSE: 164 MG/DL (ref 70–110)

## 2022-07-15 PROCEDURE — 97116 GAIT TRAINING THERAPY: CPT | Mod: CQ

## 2022-07-15 PROCEDURE — 97530 THERAPEUTIC ACTIVITIES: CPT

## 2022-07-15 PROCEDURE — 63600175 PHARM REV CODE 636 W HCPCS: Performed by: ORTHOPAEDIC SURGERY

## 2022-07-15 PROCEDURE — 25000003 PHARM REV CODE 250: Performed by: ORTHOPAEDIC SURGERY

## 2022-07-15 PROCEDURE — 97110 THERAPEUTIC EXERCISES: CPT | Mod: CQ

## 2022-07-15 RX ADMIN — ASPIRIN 81 MG CHEWABLE TABLET 81 MG: 81 TABLET CHEWABLE at 08:07

## 2022-07-15 RX ADMIN — ACETAMINOPHEN 1000 MG: 500 TABLET ORAL at 05:07

## 2022-07-15 RX ADMIN — FAMOTIDINE 20 MG: 20 TABLET ORAL at 08:07

## 2022-07-15 RX ADMIN — PREGABALIN 75 MG: 25 CAPSULE ORAL at 08:07

## 2022-07-15 RX ADMIN — SODIUM CHLORIDE: 0.9 INJECTION, SOLUTION INTRAVENOUS at 08:07

## 2022-07-15 RX ADMIN — OXYCODONE AND ACETAMINOPHEN 1 TABLET: 5; 325 TABLET ORAL at 08:07

## 2022-07-15 RX ADMIN — CELECOXIB 200 MG: 100 CAPSULE ORAL at 08:07

## 2022-07-15 RX ADMIN — CEFAZOLIN SODIUM 2 G: 1 POWDER, FOR SOLUTION INTRAMUSCULAR; INTRAVENOUS at 03:07

## 2022-07-15 RX ADMIN — DOCUSATE SODIUM 100 MG: 100 CAPSULE, LIQUID FILLED ORAL at 08:07

## 2022-07-15 NOTE — NURSING
Report received from TRAN Uriostegui. Visualized patient and assessed patient's overall condition and appearance. No acute distress noted. Will continue to monitor

## 2022-07-15 NOTE — PLAN OF CARE
[facility] - Telemetry  Discharge Assessment    Primary Care Provider: Primary Doctor No     Discharge Assessment (most recent)       BRIEF DISCHARGE ASSESSMENT - 07/15/22 1128          Discharge Planning    Assessment Type Discharge Planning Brief Assessment     Resource/Environmental Concerns none     Support Systems Spouse/significant other     Equipment Currently Used at Home cane, straight;walker, rolling     Current Living Arrangements home/apartment/condo     Patient/Family Anticipates Transition to home with family     Patient/Family Anticipated Services at Transition home health care     DME Needed Upon Discharge  none     Discharge Plan A Home with family;Home Health     Discharge Plan B Home with family;Home Health                     SW met with patient at bedside. SW explained [facility] and will be contacting patient. Patient voiced understanding.

## 2022-07-15 NOTE — NURSING
Bedside Report given to night nurse Moody, Walking rounds completed. Visualized and assessed patient NAD noted. Safety precautions maintained and call light within reach.     Chart check completed.

## 2022-07-15 NOTE — PLAN OF CARE
Problem: Occupational Therapy  Goal: Occupational Therapy Goal  Description: Goals to be met by: 7/28/22    Patient will increase functional independence with ADLs by performing:    LE Dressing with Modified [place] and use of AE as needed for safety.   Grooming while standing at sink with Modified [place].  Toileting from toilet with Modified [place] for hygiene and clothing management.   Supine to sit with Modified [place].  Step transfer with Modified [place]  Toilet transfer to toilet with Modified [place].    Outcome: Ongoing, Progressing    Pt very pleasant and willing to participate in tx session this date; pt was able to ambulate functional household distances and perform transfers w/ SBA-supervision and use of RW. Pt tolerated tx session well and will continue to benefit from skilled acute OT services to maximize functional capacity for safe performance w/ ADLs and functional mobility.

## 2022-07-15 NOTE — PROGRESS NOTES
"ORTHOPAEDIC POSTOP PROGRESS NOTE       Subjective   Patient states that they are comfortable. Able to ambulate with PT.     Objective   VITAL SIGNS: /63 (BP Location: Left arm, Patient Position: Lying)   Pulse 63   Temp 97.5 °F (36.4 °C) (Oral)   Resp 18   Ht 5' 10" (1.778 m)   Wt 110.2 kg (243 lb)   SpO2 97%   BMI 34.87 kg/m²    INTAKE AND OUTPUT:   Intake/Output Summary (Last 24 hours) at 7/15/2022 0904  Last data filed at 7/15/2022 0357  Gross per 24 hour   Intake 1450 ml   Output 950 ml   Net 500 ml        PHYSICAL EXAMINATION:  Right Lower Extremity:    Dorsalis pedis pulses palpable     Dorsi flexion 5/5 strength    Plantar flexion 5/5 strength     Extensor hallucis extension: 5/5 strength    Sensory intact to light touch L1-S1    Dressing clean/dry/intact    LABS: Reviewed      Assessment/Plan:    0-120 degrees of active/passive flexion  23 hours IV Abx  Aquacel x7days  PO Pain Meds  PT/OT  ASA 81mg BID [id number] for VTE prophylaxis  Restart Home Meds  Dispo: Home today pending PT clearance, Home health arranged. Scripts sent to our pharmacy for bedside delivery    Problem List: There are no problems to display for this patient.     "

## 2022-07-15 NOTE — ANESTHESIA POSTPROCEDURE EVALUATION
Anesthesia Post Evaluation    Patient: Tiki Willoughby    Procedure(s) Performed: Procedure(s) (LRB):  ARTHROPLASTY, KNEE, TOTAL. BETH (Right)    Final Anesthesia Type: spinal      Patient location during evaluation: PACU  Patient participation: Yes- Able to Participate  Level of consciousness: awake and alert and oriented  Post-procedure vital signs: reviewed and stable  Pain management: adequate  Airway patency: patent    PONV status at discharge: No PONV  Anesthetic complications: no      Cardiovascular status: hemodynamically stable and blood pressure returned to baseline  Respiratory status: spontaneous ventilation, room air and unassisted  Hydration status: euvolemic  Follow-up not needed.          Vitals Value Taken Time   /63 07/15/22 0721   Temp 36.4 °C (97.5 °F) 07/15/22 0721   Pulse 63 07/15/22 0721   Resp 18 07/15/22 0845   SpO2 97 % 07/15/22 0721         Event Time   Out of Recovery 14:45:00         Pain/Gisell Score: Pain Rating Prior to Med Admin: 4 (7/15/2022  8:45 AM)  Pain Rating Post Med Admin: 0 (7/14/2022 10:02 PM)  Gisell Score: 10 (7/14/2022  7:41 PM)

## 2022-07-15 NOTE — PLAN OF CARE
[facility] - Telemetry  Discharge Final Note    Primary Care Provider: Primary Doctor No    Expected Discharge Date: 7/15/2022    Final Discharge Note (most recent)       Final Note - 07/15/22 1132          Final Note    Assessment Type Final Discharge Note     Anticipated Discharge Disposition Home-Health Care Svc     What phone number can be called within the next 1-3 days to see how you are doing after discharge? [phone]     Hospital Resources/Appts/Education Provided Provided patient/caregiver with written discharge plan information;Appointments scheduled by Navigator/Coordinator;Appointments scheduled and added to AVS        Post-Acute Status    Post-Acute Authorization Home Health     Home Health Status Set-up Complete/Auth obtained     Discharge Delays None known at this time                     Important Message from Medicare             Contact Info       [facility]    [address] [facility] [address]   Phone: [phone]       Next Steps: Follow up    Instructions: Home Health           secure chat nurse Moody that patient is cleared from case management standpoint.

## 2022-07-15 NOTE — PT/OT/SLP PROGRESS
Occupational Therapy   Treatment    Name: Tiki Willoughby  MRN: [id number]  Admitting Diagnosis:  <principal problem not specified>  1 Day Post-Op    Recommendations:     Discharge Recommendations: home health OT  Discharge Equipment Recommendations:  none  Barriers to discharge:  None    Assessment:     Tiki Willoughby is a 62 y.o. male with a medical diagnosis of <principal problem not specified>.  Performance deficits affecting function are orthopedic precautions, impaired skin, edema, decreased ROM, pain, decreased lower extremity function.     Pt very pleasant and willing to participate in tx session this date; pt was able to ambulate functional household distances and perform transfers w/ SBA-supervision and use of RW. Pt tolerated tx session well and will continue to benefit from skilled acute OT services to maximize functional capacity for safe performance w/ ADLs and functional mobility.     Rehab Prognosis:  Good; patient would benefit from acute skilled OT services to address these deficits and reach maximum level of function.       Plan:     Patient to be seen 3 x/week, 5 x/week to address the above listed problems via self-care/home management, therapeutic activities, therapeutic exercises  · Plan of Care Expires: 07/28/22  · Plan of Care Reviewed with: patient, spouse    Subjective     Pain/Comfort:  ·      Objective:     Communicated with: Nurse prior to session.  Patient found up in chair with telemetry, peripheral IV, cryotherapy upon OT entry to room.    General Precautions: Standard, fall   Orthopedic Precautions:N/A   Braces: N/A  Respiratory Status: Room air     Occupational Performance:     Bed Mobility:    · Pt found/left in chair     Functional Mobility/Transfers:  · Patient completed Sit <> Stand Transfer x 1 trial from EOB and x 1 trial from toilet with supervision and stand by assistance  with  rolling walker   · Patient completed Bed <> Chair Transfer using Step Transfer technique with  supervision and stand by assistance with rolling walker  · Patient completed Toilet Transfer Step Transfer technique with supervision and stand by assistance with  rolling walker  · Functional Mobility: Pt was able to ambulate short distances in room w/ SBA-supervision and use of RW; pt w/o LOB. Required cueing for pacing activities.     Activities of Daily Living:  · Toileting: stand by assistance for all parts       [facility] 6 Click ADL: 24    Treatment & Education:  -Pt performed ADLs and functional mobility as noted above.   -Pt educated on safe LB dressing technique per handout.   -Discussed DME needs once at homel pt has BSC, RW and SC.   -Questions and concerns addressed within OT scope.    Patient left up in chair with all lines intact and call button in reachEducation:      GOALS:   Multidisciplinary Problems     Occupational Therapy Goals        Problem: Occupational Therapy    Goal Priority Disciplines Outcome Interventions   Occupational Therapy Goal     OT, PT/OT Ongoing, Progressing    Description: Goals to be met by: 7/28/22    Patient will increase functional independence with ADLs by performing:    LE Dressing with Modified [place] and use of AE as needed for safety.   Grooming while standing at sink with Modified [place].  Toileting from toilet with Modified [place] for hygiene and clothing management.   Supine to sit with Modified [place].  Step transfer with Modified [place]  Toilet transfer to toilet with Modified [place].                     Time Tracking:     OT Date of Treatment: 07/15/22  OT Start Time: 0950  OT Stop Time: 1005  OT Total Time (min): 15 min    Billable Minutes:Therapeutic Activity 15  Total Time 15    OT/NATHALY: OT          7/15/2022

## 2022-07-15 NOTE — PLAN OF CARE
Problem: Physical Therapy  Goal: Physical Therapy Goal  Description: Goals to be met by: 7/28/22    Patient will increase functional independence with mobility by performing:    Supine to sit with [place]  Sit to supine with [place]  Sit to stand transfer with Modified [place]  Bed to chair transfer with Modified [place] using Rolling Walker  Gait  x 150 feet with Modified [place] using Rolling Walker.   Lower extremity exercise program x10 reps per handout, with independence    Outcome: Ongoing, Progressing   Pt ambulated 260 ft with RW, SBA/S. Pt is progressing well toward treatment goals.

## 2022-07-15 NOTE — PT/OT/SLP PROGRESS
Physical Therapy Treatment    Patient Name:  Tiki Willoughby   MRN:  [id number]    Recommendations:     Discharge Recommendations:  home health PT   Discharge Equipment Recommendations:  Pt already had a RW at home   Barriers to discharge: None    Assessment:     Tiki Willoughby is a 62 y.o. male admitted with a medical diagnosis of <principal problem not specified>.  He presents with the following impairments/functional limitations:  weakness, gait instability, impaired balance, decreased lower extremity function, decreased ROM, pain, decreased safety awareness, edema, orthopedic precautions, impaired self care skills .    Rehab Prognosis: Good; patient would benefit from acute skilled PT services to address these deficits and reach maximum level of function.    Recent Surgery: Procedure(s) (LRB):  ARTHROPLASTY, KNEE, TOTAL. BETH (Right) 1 Day Post-Op    Plan:     During this hospitalization, patient to be seen BID to address the identified rehab impairments via gait training, therapeutic activities, therapeutic exercises and progress toward the following goals:    · Plan of Care Expires:  07/28/22    Subjective     Chief Complaint: none   Patient/Family Comments/goals: pt is pleasant and motivated to participate in therapy   Pain/Comfort:  · Pain Rating 1: 0/10  · Pain Addressed 1: Pre-medicate for activity  · Pain Rating Post-Intervention 1: 0/10      Objective:     Communicated with nurse prior to session.  Patient found HOB elevated with telemetry, peripheral IV, cryotherapy upon PT entry to room.     General Precautions: Standard, fall   Orthopedic Precautions:N/A   Braces: N/A  Respiratory Status: Room air     Functional Mobility:  · Bed Mobility:     · Rolling Left:  modified independence  · Scooting: modified independence  · Bridging: modified independence  · Supine to Sit: supervision  · Transfers:     · Sit to Stand:  stand by assistance with rolling walker. VC's for safety , proper technique and walker  management.   · Gait: pt ambulated 260 ft with RW, SBA/S.  Noted with decreased R heel strike and decreased weight shifting ability, no LOB. Pt is slightly impulsive , required frequent cueing to slow down and for safety.    · Balance:  good      AM-PAC 6 CLICK MOBILITY  Turning over in bed (including adjusting bedclothes, sheets and blankets)?: 4  Sitting down on and standing up from a chair with arms (e.g., wheelchair, bedside commode, etc.): 4  Moving from lying on back to sitting on the side of the bed?: 4  Moving to and from a bed to a chair (including a wheelchair)?: 4  Need to walk in hospital room?: 3  Climbing 3-5 steps with a railing?: 3  Basic Mobility Total Score: 22       Therapeutic Activities and Exercises:   BLE HEP given with instructions and demonstrations . Pt/spouse verbalized understanding.   Encouraged pt to perform BLE ex's per handout throughout the day. Pt verbalize understanding.  Lower Extremity Exercises.   Patient educated on the purpose of therapeutic exercise.     Patient verbalized acceptance/understanding of instructions, expectations, and limitations(for safety).   Patient performed: 15 reps (each) of B LE There Ex: AP, LAQ, HSC, Hip flexion while sitting up on EOB.        Patient required  verbal cues/tactile cues to ensure correct sequence, to maintain proper form, and to allow for self-correction.  Educated and discussed with pt/spouse on safety and proper technique with cryotherapy , all OOB mobility , stair/ curb step . Pt verbalize understanding. Pt denied any needs from PT services.       Patient left up in chair with all lines intact, call button in reach, bed alarm on and spouse and OT present..    GOALS:   Multidisciplinary Problems     Physical Therapy Goals        Problem: Physical Therapy    Goal Priority Disciplines Outcome Goal Variances Interventions   Physical Therapy Goal     PT, PT/OT Ongoing, Progressing     Description: Goals to be met by: 7/28/22    Patient  will increase functional independence with mobility by performing:    Supine to sit with [place]  Sit to supine with [place]  Sit to stand transfer with Modified [place]  Bed to chair transfer with Modified [place] using Rolling Walker  Gait  x 150 feet with Modified [place] using Rolling Walker.   Lower extremity exercise program x10 reps per handout, with independence                     Time Tracking:     PT Received On: 07/15/22  PT Start Time: 0920     PT Stop Time: 0945  PT Total Time (min): 25 min     Billable Minutes: Gait Training 14 and Therapeutic Exercise 11    Treatment Type: Treatment  PT/PTA: PTA     PTA Visit Number: 1     07/15/2022

## 2022-07-15 NOTE — DISCHARGE SUMMARY
[facility]  Orthopedics  Discharge Summary      Patient Name: Tiki Willoughby  MRN: [id number]  Admission Date: 7/14/2022  Hospital Length of Stay: 0 days  Discharge Date and Time:  07/15/2022 9:04 AM  Attending Physician: Stevo Kohler MD   Discharging Provider: Stevo Kohler MD  Primary Care Provider: Primary Doctor No    HPI: Right Knee OA    Procedure(s) (LRB):  ARTHROPLASTY, KNEE, TOTAL. BETH (Right)      Hospital Course    The patient is a 63 yo male who has been followed in clinic for right knee arthritis. It was determined she would benefit from surgery. The procedure, its risks, benefits, and potential complications were discussed in detail with the patient prior to surgery. Understanding of all topics was conveyed by the patient, and consent was given for surgery. The patient was admitted to [facility] on 7/14/2022 for right knee arthroplasty.    The procedure was tolerated well and he was sent to the post-operative recovery room in stable condition, where he also did well. Post-operative x-rays in the recovery room showed well-aligned components without evidence of complication or fracture. He was subsequently sent to his hospital room for postoperative management.  ?  Once on the floor hispostoperative course  was unremarkable and he did well. his diet was advanced which was tolerated. his pain was well controlled on multimodal pain medication; this was eventually transitioned to oral medication alone prior to discharge. He worked with Physical Therapy starting on POD#0 who recommended he be discharged home. Their dressing remained clean dry and intact throughout the hospital stay. He remained afebrile with stable vital signs throughout the stay. He/she was stable for discharge on POD#1.    Consults (From admission, onward)        Status Ordering Provider     IP consult case management/social work  Once        Provider:  (Not yet assigned)    Acknowledged STEVO KOHLER           Significant Diagnostic Studies: Labs: BMP: No results for input(s): GLU, NA, K, CL, CO2, BUN, CREATININE, CALCIUM, MG in the last 48 hours. and CBC No results for input(s): WBC, HGB, HCT, PLT in the last 48 hours.    Pending Diagnostic Studies:     None        There are no hospital problems to display for this patient.     Discharged Condition: good    Disposition: Home-Health Care [facility]    Follow Up:    Patient Instructions:   No discharge procedures on file.  Medications:  Reconciled Home Medications:      Medication List      START taking these medications    celecoxib 200 MG capsule  Commonly known as: CeleBREX  Take 1 capsule (200 mg total) by mouth 2 (two) times daily. for 7 days     docusate sodium 100 MG capsule  Commonly known as: COLACE  Take 1 capsule (100 mg total) by mouth 2 (two) times daily.     methocarbamoL 500 MG Tab  Commonly known as: ROBAXIN  Take 1 tablet (500 mg total) by mouth 4 (four) times daily. for 10 days     ondansetron 4 MG Tbdl  Commonly known as: ZOFRAN-ODT  Dissolve 2 tablets (8 mg total) by mouth every 8 (eight) hours as needed.     oxyCODONE 5 MG immediate release tablet  Commonly known as: ROXICODONE  Take 1 tablet (5 mg total) by mouth every 4 (four) hours as needed (pain).     pregabalin 75 MG capsule  Commonly known as: LYRICA  Take 1 capsule (75 mg total) by mouth 2 (two) times daily. for 14 days        CHANGE how you take these medications    aspirin 81 MG Chew  Take 1 tablet (81 mg total) by mouth 2 (two) times a day.  What changed: when to take this        CONTINUE taking these medications    atorvastatin 10 MG tablet  Commonly known as: LIPITOR  Take 10 mg by mouth Daily.     cetirizine 10 mg Cap  Take 10 mg by mouth Daily.     cyanocobalamin 1000 MCG tablet  Commonly known as: VITAMIN B-12  Take 100 mcg by mouth once daily.     fluticasone propionate 50 mcg/actuation nasal spray  Commonly known as: FLONASE  1 spray by Each Nostril route daily as needed for  Rhinitis.     lisinopriL 20 MG tablet  Commonly known as: PRINIVIL,ZESTRIL  Take 20 mg by mouth once daily.     metFORMIN 1000 MG tablet  Commonly known as: GLUCOPHAGE  Take 1,000 mg by mouth daily with breakfast.     naproxen 500 MG EC tablet  Commonly known as: EC NAPROSYN  Take 500 mg by mouth 2 (two) times daily as needed.     TAMSULOSIN ORAL  Take 0.4 mg by mouth Daily.            Stevo Kohler MD  Orthopedics  [facility]

## 2022-07-17 ENCOUNTER — PATIENT MESSAGE (OUTPATIENT)
Dept: ORTHOPEDICS | Facility: CLINIC | Age: 62
End: 2022-07-17
Payer: OTHER GOVERNMENT

## 2022-07-18 DIAGNOSIS — Z96.651 STATUS POST RIGHT KNEE REPLACEMENT: Primary | ICD-10-CM

## 2022-07-25 RX ORDER — OXYCODONE HYDROCHLORIDE 5 MG/1
5 TABLET ORAL EVERY 4 HOURS PRN
Qty: 50 TABLET | Refills: 0 | Status: SHIPPED | OUTPATIENT
Start: 2022-07-25 | End: 2022-08-09

## 2022-07-26 ENCOUNTER — PATIENT MESSAGE (OUTPATIENT)
Dept: ORTHOPEDICS | Facility: CLINIC | Age: 62
End: 2022-07-26
Payer: OTHER GOVERNMENT

## 2022-07-26 DIAGNOSIS — Z96.651 STATUS POST RIGHT KNEE REPLACEMENT: Primary | ICD-10-CM

## 2022-08-04 ENCOUNTER — PATIENT MESSAGE (OUTPATIENT)
Dept: ORTHOPEDICS | Facility: CLINIC | Age: 62
End: 2022-08-04
Payer: OTHER GOVERNMENT

## 2022-08-05 ENCOUNTER — CLINICAL SUPPORT (OUTPATIENT)
Dept: REHABILITATION | Facility: HOSPITAL | Age: 62
End: 2022-08-05
Attending: ORTHOPAEDIC SURGERY
Payer: OTHER GOVERNMENT

## 2022-08-05 DIAGNOSIS — R29.898 DECREASED STRENGTH OF LOWER EXTREMITY: ICD-10-CM

## 2022-08-05 DIAGNOSIS — Z96.651 STATUS POST RIGHT KNEE REPLACEMENT: ICD-10-CM

## 2022-08-05 DIAGNOSIS — M25.661 DECREASED ROM OF RIGHT KNEE: ICD-10-CM

## 2022-08-05 DIAGNOSIS — R26.89 ANTALGIC GAIT: ICD-10-CM

## 2022-08-05 PROCEDURE — 97110 THERAPEUTIC EXERCISES: CPT | Mod: PN

## 2022-08-05 PROCEDURE — 97140 MANUAL THERAPY 1/> REGIONS: CPT | Mod: PN

## 2022-08-05 PROCEDURE — 97161 PT EVAL LOW COMPLEX 20 MIN: CPT | Mod: PN

## 2022-08-08 NOTE — PLAN OF CARE
Physical Therapy Initial Evaluation     Name: Tiki Willoughby  Clinic Number: [id number]    Therapy Diagnosis:   Encounter Diagnoses   Name Primary?    Status post right knee replacement     Decreased ROM of right knee     Decreased strength of lower extremity     Antalgic gait      Physician: Stevo Kohler MD    Physician Orders: PT Eval and Treat   Medical Diagnosis from Referral: Z96.651 (ICD-10-CM) - Status post right knee replacement  Evaluation Date: 8/5/2022  Authorization Period Expiration: 2/05/2023  Plan of Care Expiration: 11/5/22  Visit # / Visits authorized: 1/ 1    Time In: 0710a  Time Out: 0759  Total Billable Time: 49 minutes    Precautions: Diabetes and HTN     S/p right TKA Dr. Kohler 7/14/22  POW 3 (8/4-8/11)    Subjective   Date of onset: right TKA 7/14/22   History of current condition - Tiki reports: He was getting home health and it stopped last week. He was unable to get approved with [facility] until this week and that is why he had some break. However, he reports that he has been doing his exercises at home. For his job he needs to be able to get off the floor and get on and off the ladder.      Medical History:   Past Medical History:   Diagnosis Date    Arthritis     Diabetes mellitus     History of respiratory tract infection     Hypertension     Obesity (BMI 30-39.9)     Obesity (BMI 30-39.9)        Surgical History:   Tiki Willoughby  has a past surgical history that includes Joint replacement and Total knee arthroplasty (Right, 7/14/2022).    Medications:   Tiki has a current medication list which includes the following prescription(s): aspirin, atorvastatin, cetirizine, cyanocobalamin, docusate sodium, fluticasone propionate, lisinopril, metformin, naproxen, ondansetron, oxycodone, pregabalin, and tamsulosin hcl.    Allergies:   Review of patient's allergies indicates:   Allergen Reactions    Shellfish containing  "products Anaphylaxis    Bananas [banana] Other (See Comments)     Ears itch    Penicillins Other (See Comments)     Ears itch        Imaging, x-ray per pt chart: Status post interval recent right TKA demonstrating anatomic positioning and alignment.  No new displaced fracture or dislocation.  Scattered subcutaneous gas about the knee consistent with recent surgery.  Otherwise no change.  Please refer to operative/procedure report for further details.    Prior Therapy: Home health  Social History: 1 step up into house lives with their spouse  Occupation: air condition refrigeration  Prior Level of Function: independent  DME owned/used: RW  Current Level of Function: walking,     Pain:  Current 4/10, worst 6/10, best 0/10   Location: right knee   Description: Aching  Aggravating Factors: heat, walking  Easing Factors: moving, ice, pain medication    Pts goals: "I want to be able to walk by myself, get down on my knees, and get myself up."    Objective       Observation: increase in knee flexion during gait, ambulating with RW, forward flexed posture  Jose's Sign: negative    Range of Motion: Knee   Left Right   Flexion: 115 92   Extension 0 26 degrees of flexion      Strength: Knee   Left Right   Quadriceps 5/5 3+/5   Hamstrings 5/5 3+/5       Strength: Hip   Left Right   Iliopsoas 4+/5 3+/5   Glute Med NT NT   Hip IR 4+/5 3+/5   Hip ER 4/5 3+/5   Hip Ext NT NT   Ankle PF 4+/5 3+/5   Ankle DF 4+/5 3+/5     Joint Mobility: hypomobile  Palpation: tenderness around incision site  Sensation: intact to light touch    Timed up and go: 29 seconds with RW  2 MWT: NT  30 second sit to stand: 9 seconds with use of hands    Edema:  Left: absent  Right: moderate      Gait: Willoughby ambulated with rolling walker.  Level of Assistance: Flip  Patient displays antalgic gait, decrease in TKE RLE.   Balance: NT    CMS Impairment/Limitation/Restriction for FOTO Knee Survey    Therapist reviewed FOTO scores for Tiki Willoughby on " "8/5/2022.   FOTO documents entered into [employer] - see Media section.    Limitation Score: 71%        Pt/family was provided educational information, including: role of PT, goals for PT, scheduling - pt verbalized understanding. Discussed insurance limitations with pt.     TREATMENT     Treatment Time In: 0740  Treatment Time Out: 0759  Total Treatment time separate from Evaluation: 19 minutes    Tiki received therapeutic exercises to develop ROM, flexibility and posture for 11 minutes including:    Heel prop 3'  Quad set c/ heel prop 5" hold x 2 minutes  Calf stretch c/ heel prop 30"x4  LAQ 3" hold 20x    Tiki received the following manual therapy techniques: Joint mobilizations were applied to the: Right patella for 8 minutes, including:    Superior<>inferior grade II-III      Home Exercises and Patient Education Provided    Education provided:   - HEP  - POC  - need for improvements with extension     Written Home Exercises Provided: yes.  Exercises were reviewed and Tiki was able to demonstrate them prior to the end of the session.  Tiki demonstrated good  understanding of the education provided.     See EMR under Patient Instructions for exercises provided 8/5/2022.    Assessment     Tiki is a 62 y.o. male referred to outpatient Physical Therapy with a medical diagnosis of Status post right knee replacement on 7/14/22 by Dr. Kohler. with signs and symptoms including: increased knee pain, decreased knee ROM, decreased LE Strength, soft tissue dysfunction, postural imbalance,impaired joint mobility, and decreased tolerance to functional activities. Signs and symptoms consistent with medical diagnosis. He is at an increase risk for falls due to increase in time required to complete Timed up and Go as well as only completing 9 sit to stands in 30 seconds. He is currently has significant limitations with knee extension and will need to improve before progressing with knee flexion or strengthening.   Pt with " good motivation to perform physical activity and responds well to cueing.  Pt prognosis is Good.   Pt will benefit from skilled outpatient Physical Therapy to address the deficits stated above and in the chart below, provide pt/family education, and to maximize pt's level of independence.     Plan of care discussed with patient: Yes  Pt's spiritual, cultural and educational needs considered and patient is agreeable to the plan of care and goals as stated below:     Anticipated Barriers for therapy: anxiety    Medical Necessity is demonstrated by the following  History  Co-morbidities and personal factors that may impact the plan of care Co-morbidities:   Arthritis   Diabetes mellitus   History of respiratory tract infection   Hypertension   Obesity (BMI 30-39.9)   Obesity (BMI 30-39.9)     Personal Factors:   age  coping style  social background  lifestyle  character     moderate   Examination  Body Structures and Functions, activity limitations and participation restrictions that may impact the plan of care Body Regions:   lower extremities    Body Systems:    gross symmetry  ROM  strength  gross coordinated movement  balance  gait  transfers  transitions  motor control  motor learning  edema  scar formation  skin integrity    Participation Restrictions:   Walking, transfers, mobility, endurance    Activity limitations:   Learning and applying knowledge  no deficits    General Tasks and Commands  no deficits    Communication  no deficits    Mobility  lifting and carrying objects  walking  driving (bike, car, motorcycle)    Self care  dressing  looking after one's health    Domestic Life  shopping  cooking  doing house work (cleaning house, washing dishes, laundry)  assisting others    Interactions/Relationships  no deficits    Life Areas  no deficits    Community and Social Life  community life  recreation and leisure         moderate   Clinical Presentation stable and uncomplicated low   Decision Making/  Complexity Score: low     Pt's spiritual, cultural and educational needs considered and pt agreeable to plan of care and goals as stated below:     Short Term GOALS: 4 weeks. Pt agrees with goals set.  1. Patient demonstrates independence with HEP.   2. Patient demonstrates increased right knee ROM 0  to 100 to improve tolerance to functional activities pain free.   3. Patient demonstrates ability to perform Timed up and Go in 20 seconds with LRAD to improve mobility and decrease risk for falls.   4. Patient demonstrates ability to perform 15 sit to stands in 30 seconds to improve bilateral LE strength.     Long Term GOALS: 8 weeks. Pt agrees with goals set.  1. Patient demonstrates increased right knee ROM 0 to 120 to improve tolerance to functional activities pain free.   2. Patient demonstrates increased strength BLE's to 4+/5 or greater to improve tolerance to functional activities pain free.   3. Patient demonstrates improved overall function per FOTO Knee Survey to 42% Limitation or less.   4. Patient demonstrates ability to perform Timed up and Go in 15 seconds or less with LRAD to improve mobility and decrease risk for falls  5. Patient demonstrates ability to walk 450ft in 2 minute with LRAD to improve mobility and endurance.   6. Patient goal: patient demonstrates ability to kneel down onto ground and return to standing with little to no difficulty to PLOF and perform job tasks.     PLAN       Plan of care Certification: 8/5/2022 to 11/5/22.    Outpatient Physical Therapy 1-2 times weekly for 8 weeks to include the following interventions: Gait Training, Manual Therapy, Moist Heat/ Ice, Neuromuscular Re-ed, Patient Education, Therapeutic Activities, Therapeutic Exercise and FDN PRN.  Pt may be seen by PTA as part of the rehabilitation team.     Meagan Smith, PT,DPT  8/5/2022    I have seen the patient, reviewed the therapist's plan of care, and I agree with the plan of care.      I certify the need for  these services furnished under this plan of treatment and while under my care.     ___________________ ________ Physician/Referring Practitioner            ___________________________ Date of Signature

## 2022-08-09 ENCOUNTER — CLINICAL SUPPORT (OUTPATIENT)
Dept: REHABILITATION | Facility: HOSPITAL | Age: 62
End: 2022-08-09
Attending: ORTHOPAEDIC SURGERY
Payer: OTHER GOVERNMENT

## 2022-08-09 ENCOUNTER — OFFICE VISIT (OUTPATIENT)
Dept: ORTHOPEDICS | Facility: CLINIC | Age: 62
End: 2022-08-09
Payer: OTHER GOVERNMENT

## 2022-08-09 VITALS
OXYGEN SATURATION: 99 % | RESPIRATION RATE: 18 BRPM | SYSTOLIC BLOOD PRESSURE: 133 MMHG | DIASTOLIC BLOOD PRESSURE: 79 MMHG | BODY MASS INDEX: 34.79 KG/M2 | HEIGHT: 70 IN | WEIGHT: 243 LBS | HEART RATE: 80 BPM

## 2022-08-09 DIAGNOSIS — Z96.651 STATUS POST RIGHT KNEE REPLACEMENT: Primary | ICD-10-CM

## 2022-08-09 DIAGNOSIS — M25.661 DECREASED ROM OF RIGHT KNEE: Primary | ICD-10-CM

## 2022-08-09 DIAGNOSIS — R26.89 ANTALGIC GAIT: ICD-10-CM

## 2022-08-09 DIAGNOSIS — R29.898 DECREASED STRENGTH OF LOWER EXTREMITY: ICD-10-CM

## 2022-08-09 PROCEDURE — 97140 MANUAL THERAPY 1/> REGIONS: CPT | Mod: PN

## 2022-08-09 PROCEDURE — 97110 THERAPEUTIC EXERCISES: CPT | Mod: PN

## 2022-08-09 PROCEDURE — 99024 POSTOP FOLLOW-UP VISIT: CPT | Mod: ,,, | Performed by: ORTHOPAEDIC SURGERY

## 2022-08-09 PROCEDURE — 99999 PR PBB SHADOW E&M-EST. PATIENT-LVL IV: CPT | Mod: PBBFAC,,, | Performed by: ORTHOPAEDIC SURGERY

## 2022-08-09 PROCEDURE — 99024 PR POST-OP FOLLOW-UP VISIT: ICD-10-PCS | Mod: ,,, | Performed by: ORTHOPAEDIC SURGERY

## 2022-08-09 PROCEDURE — 99999 PR PBB SHADOW E&M-EST. PATIENT-LVL IV: ICD-10-PCS | Mod: PBBFAC,,, | Performed by: ORTHOPAEDIC SURGERY

## 2022-08-09 PROCEDURE — 99214 OFFICE O/P EST MOD 30 MIN: CPT | Mod: PBBFAC,PN | Performed by: ORTHOPAEDIC SURGERY

## 2022-08-09 RX ORDER — OXYCODONE AND ACETAMINOPHEN 5; 325 MG/1; MG/1
1-2 TABLET ORAL EVERY 6 HOURS PRN
Qty: 40 TABLET | Refills: 0 | Status: SHIPPED | OUTPATIENT
Start: 2022-08-09

## 2022-08-09 NOTE — PROGRESS NOTES
"Ortho Knee Follow Up Note    PCP: Primary Doctor No   Referring Provider: No referring provider defined for this encounter.        Assessment:  62 y.o. male status post right total Knee Primary completed on 7/14/22.    Plan:  Follow up 3 months  Future Radiographs Indicated at next visit: no    Pain Management: Continue current pain management and Wean narcotic medications as tolerated  Anticoagulation: Continue ASA for total of 4 weeks duration post operatively  Wound Care: Ok to shower. No scrubbing incision. No soaking in pools or tubs for 6 weeks post operative.     Patient Reassurance:   Post-operative course discussed with patient. Patient reassured and supported. All questions answered.    ACTIVE PROBLEM LIST  Patient Active Problem List   Diagnosis    Decreased ROM of right knee    Decreased strength of lower extremity    Antalgic gait           HPI:  Tiki Willoughby presents today for a post-op visit.     STATUS POST:  right total Knee Primary  BMI: Body mass index is 34.87 kg/m².    Post operative recovery was complicated by: None    Patient rates his condition as improving. Pleased with surgical outcome to date.     Functional Assessment:  Started Outpatient PT  Functional Difficulties:  Interferes with sleep, Stair climbing, Arising from chair and Walking  Pain Medication:  Narcotic  Anticoagulation: Aspirin     EXAM:    right POST-OPERATIVE KNEE    /79   Pulse 80   Resp 18   Ht 5' 10" (1.778 m)   Wt 110.2 kg (243 lb)   SpO2 99%   BMI 34.87 kg/m²     Skin:  Appropriate post op appearance, No evidence of erythema, warmth, discharge, or drainage and Incision clean/dry/intact  Range of Motion: Flexion: 95, Extension 8  Neurovascular Status: Sensation intact in Sural, Saphenous, SPN, DPN and Tibial nerve distribution. 5 out of 5 strength in hip flexion, knee flexion/extension, ankle plantarflexion/dorsiflexion. 2+ dorsalis pedis    IMAGING:  X-ray Knee:   Implants are well fixed and aligned. " There is no evidence of loosening.

## 2022-08-10 NOTE — PROGRESS NOTES
"  Physical Therapy Daily Treatment Note     Name: Tiki Willoughby  Clinic Number: [id number]    Therapy Diagnosis:   Encounter Diagnoses   Name Primary?    Decreased ROM of right knee Yes    Decreased strength of lower extremity     Antalgic gait      Physician: Stevo Kohler MD    Visit Date: 8/11/2022    Physician Orders: PT Eval and Treat   Medical Diagnosis from Referral: Z96.651 (ICD-10-CM) - Status post right knee replacement  Evaluation Date: 8/5/2022    pn due: 9/5/22  Authorization Period Expiration: 2/05/2023  Plan of Care Expiration: 11/5/22  Visit # / Visits authorized: 2/ 15 (+eval)     Time In: 0700a  Time Out: 0800a  Total Billable Time: 30 minutes 1:1 c/ PT     Precautions: Diabetes and HTN      S/p right TKA Dr. Kohler 7/14/22  POW 3 (8/4-8/11)    Subjective     Pt reports: His doctor wants him to start practicing walking with a cane. He reports he has a SPC at home.  He was compliant with home exercise program.  Response to previous treatment: good. Feeling a little better  Functional change: no changes noted    Pain: 3/10  Location: right knee      Objective     08/11/2022    Knee extension at beginning of session: lacking 15*     After manual: Lacking 8*    Knee flexion beginning of session: 105*    Tiki received therapeutic exercises to develop strength, ROM and flexibility for 30 minutes including: bolded = performed    Nustep 6' (beginning of session)  Heel prop 5' +3#  Quad set c/ heel prop +Russian 10" hold x 10 minutes  Calf stretch c/ heel prop 30"x4  LAQ 5" hold 20x  SAQ 5"x20  +amulating with SPC education 1 lap around clinic    Tiki received the following manual therapy techniques: Joint mobilizations and Soft tissue Mobilization were applied to the: Right knee for 30 minutes, including:    Superior<>inferior grade II-III  +joint mobs for extension   STM posterior knee and hamstring    Tiki participated in neuromuscular re-education activities to improve: Balance and " Proprioception for 00 minutes. The following activities were included:      Tiki received cold pack for 00 minutes to Right knee.      Home Exercises Provided and Patient Education Provided     Education provided:   - Continue HEP  - icing at home for swelling    Written Home Exercises Provided: Patient instructed to cont prior HEP.  Exercises were reviewed and Tiki was able to demonstrate them prior to the end of the session.  Tiki demonstrated good  understanding of the education provided.     See EMR under Patient Instructions for exercises provided prior visit.    Assessment     Mr. Fairbanks arrived to today's session continuing to lack full knee extension. PT spent significant amount of time with manual to improve knee extension and was able to improve by 5 degrees during session. He continues to recruit posterior chain for extension and lacks appropriate quad activation. Therefore, PT utilized NMES for muscle activation. PT to assess pt's response at NV. PT also educated pt on proper gait with SPC to improve mobility. Pt displayed good mechanics and PT believes he is appropriate to transition to cane safely at this time.   Tiki Is progressing well towards his goals.   Pt prognosis is Good.     Pt will continue to benefit from skilled outpatient physical therapy to address the deficits listed in the problem list box on initial evaluation, provide pt/family education and to maximize pt's level of independence in the home and community environment.     Pt's spiritual, cultural and educational needs considered and pt agreeable to plan of care and goals.     Anticipated barriers to physical therapy: anxiety    Short Term GOALS: 4 weeks. Pt agrees with goals set.   1. Patient demonstrates independence with HEP. Progressing, not met  2. Patient demonstrates increased right knee ROM 0  to 100 to improve tolerance to functional activities pain free. Progressing, not met  3. Patient demonstrates ability to perform  Timed up and Go in 20 seconds with LRAD to improve mobility and decrease risk for falls. Progressing, not met  4. Patient demonstrates ability to perform 15 sit to stands in 30 seconds to improve bilateral LE strength. Progressing, not met     Long Term GOALS: 8 weeks. Pt agrees with goals set.  1. Patient demonstrates increased right knee ROM 0 to 120 to improve tolerance to functional activities pain free.  Progressing, not met  2. Patient demonstrates increased strength BLE's to 4+/5 or greater to improve tolerance to functional activities pain free.  Progressing, not met  3. Patient demonstrates improved overall function per FOTO Knee Survey to 42% Limitation or less.  Progressing, not met  4. Patient demonstrates ability to perform Timed up and Go in 15 seconds or less with LRAD to improve mobility and decrease risk for falls. Progressing, not met  5. Patient demonstrates ability to walk 450ft in 2 minute with LRAD to improve mobility and endurance. Progressing, not met  6. Patient goal: patient demonstrates ability to kneel down onto ground and return to standing with little to no difficulty to PLOF and perform job tasks. Progressing, not met    Plan     Continue to improve knee extension    Meagan Smith, PT,DPT  08/11/2022

## 2022-08-11 ENCOUNTER — CLINICAL SUPPORT (OUTPATIENT)
Dept: REHABILITATION | Facility: HOSPITAL | Age: 62
End: 2022-08-11
Attending: ORTHOPAEDIC SURGERY
Payer: OTHER GOVERNMENT

## 2022-08-11 DIAGNOSIS — R29.898 DECREASED STRENGTH OF LOWER EXTREMITY: ICD-10-CM

## 2022-08-11 DIAGNOSIS — M25.661 DECREASED ROM OF RIGHT KNEE: Primary | ICD-10-CM

## 2022-08-11 DIAGNOSIS — R26.89 ANTALGIC GAIT: ICD-10-CM

## 2022-08-11 PROCEDURE — 97140 MANUAL THERAPY 1/> REGIONS: CPT | Mod: PN

## 2022-08-15 NOTE — PROGRESS NOTES
"  Physical Therapy Daily Treatment Note     Name: Tiki Willoughby  Clinic Number: [id number]    Therapy Diagnosis:   No diagnosis found.  Physician: Stevo Kohler MD    Visit Date: 8/16/2022    Physician Orders: PT Eval and Treat   Medical Diagnosis from Referral: Z96.651 (ICD-10-CM) - Status post right knee replacement  Evaluation Date: 8/5/2022    pn due: 9/5/22  Authorization Period Expiration: 2/05/2023  Plan of Care Expiration: 11/5/22  Visit # / Visits authorized: 3/ 15 (+eval)     Time In: ***  Time Out: ***  Total Billable Time: ***     Precautions: Diabetes and HTN      S/p right TKA Dr. Kohler 7/14/22  POW 3 (8/4-8/11)    Subjective     Pt reports:*** His doctor wants him to start practicing walking with a cane. He reports he has a SPC at home.  He was compliant with home exercise program.  Response to previous treatment: good. Feeling a little better  Functional change: no changes noted    Pain: 3/10  Location: right knee      Objective     08/15/2022    Knee extension at beginning of session: lacking 15*     After manual: Lacking 8*    Knee flexion beginning of session: 105*    Tiki received therapeutic exercises to develop strength, ROM and flexibility for *** minutes including: bolded = performed    Nustep 6' (beginning of session)  Heel prop 5' +3#  Quad set c/ heel prop +Russian 10" hold x 10 minutes  Calf stretch c/ heel prop 30"x4  LAQ 5" hold 20x  SAQ 5"x20  +amulating with SPC education 1 lap around clinic    Tiki received the following manual therapy techniques: Joint mobilizations and Soft tissue Mobilization were applied to the: Right knee for *** minutes, including:    Superior<>inferior grade II-III  +joint mobs for extension   STM posterior knee and hamstring    Tiki participated in neuromuscular re-education activities to improve: Balance and Proprioception for 00 minutes. The following activities were included:      Tiki received cold pack for 00 minutes to Right knee.      Home " Exercises Provided and Patient Education Provided     Education provided:   - Continue HEP  - icing at home for swelling    Written Home Exercises Provided: Patient instructed to cont prior HEP.  Exercises were reviewed and Tiki was able to demonstrate them prior to the end of the session.  Tiki demonstrated good  understanding of the education provided.     See EMR under Patient Instructions for exercises provided prior visit.    Assessment     ***Mr. Fairbanks arrived to today's session continuing to lack full knee extension. PT spent significant amount of time with manual to improve knee extension and was able to improve by 5 degrees during session. He continues to recruit posterior chain for extension and lacks appropriate quad activation. Therefore, PT utilized NMES for muscle activation. PT to assess pt's response at NV. PT also educated pt on proper gait with SPC to improve mobility. Pt displayed good mechanics and PT believes he is appropriate to transition to cane safely at this time.   Tiki Is progressing well towards his goals.   Pt prognosis is Good.     Pt will continue to benefit from skilled outpatient physical therapy to address the deficits listed in the problem list box on initial evaluation, provide pt/family education and to maximize pt's level of independence in the home and community environment.     Pt's spiritual, cultural and educational needs considered and pt agreeable to plan of care and goals.     Anticipated barriers to physical therapy: anxiety    Short Term GOALS: 4 weeks. Pt agrees with goals set.   1. Patient demonstrates independence with HEP. Progressing, not met  2. Patient demonstrates increased right knee ROM 0  to 100 to improve tolerance to functional activities pain free. Progressing, not met  3. Patient demonstrates ability to perform Timed up and Go in 20 seconds with LRAD to improve mobility and decrease risk for falls. Progressing, not met  4. Patient demonstrates  ability to perform 15 sit to stands in 30 seconds to improve bilateral LE strength. Progressing, not met     Long Term GOALS: 8 weeks. Pt agrees with goals set.  1. Patient demonstrates increased right knee ROM 0 to 120 to improve tolerance to functional activities pain free.  Progressing, not met  2. Patient demonstrates increased strength BLE's to 4+/5 or greater to improve tolerance to functional activities pain free.  Progressing, not met  3. Patient demonstrates improved overall function per FOTO Knee Survey to 42% Limitation or less.  Progressing, not met  4. Patient demonstrates ability to perform Timed up and Go in 15 seconds or less with LRAD to improve mobility and decrease risk for falls. Progressing, not met  5. Patient demonstrates ability to walk 450ft in 2 minute with LRAD to improve mobility and endurance. Progressing, not met  6. Patient goal: patient demonstrates ability to kneel down onto ground and return to standing with little to no difficulty to PLOF and perform job tasks. Progressing, not met    Plan     Continue to improve knee extension    Meagan Smith, PT,DPT  08/15/2022

## 2022-08-15 NOTE — PROGRESS NOTES
"  Physical Therapy Daily Treatment Note     Name: Tiki Willoughby  Clinic Number: [id number]    Therapy Diagnosis:   No diagnosis found.  Physician: Stevo Kohler MD    Visit Date: 8/16/2022    Physician Orders: PT Eval and Treat   Medical Diagnosis from Referral: Z96.651 (ICD-10-CM) - Status post right knee replacement  Evaluation Date: 8/5/2022    pn due: 9/5/22  Authorization Period Expiration: 2/05/2023  Plan of Care Expiration: 11/5/22  Visit # / Visits authorized: 3/ 15 (+eval)     Time In: 0905  Time Out: 1000  Total Billable Time: 30 minutes 1:1 c/ PT     Precautions: Diabetes and HTN      S/p right TKA Dr. Kohler 7/14/22  POW 4 (8/11-8/18)    Subjective     Pt reports: Pt states he has been doing his exercises at home. Pain is about the same and has been taking his pain medication.   He was compliant with home exercise program.  Response to previous treatment: no adverse response  Functional change: no changes noted    Pain: 3/10  Location: right knee      Objective     08/15/2022    Knee extension at beginning of session: lacking 11*     After manual: Lacking 8*    AAROM knee flexion= 107    Tiki received therapeutic exercises to develop strength, ROM and flexibility for 30 minutes including: bolded = performed    Nustep 6' (beginning of session)    Heel prop 5' 3#  Quad set c/ heel prop +Russian 10" hold x 10 minutes 5" 20x (did not perform ESTIM today- see note)   Calf stretch c/ heel prop 30"x4  LAQ 5" hold 20x  SAQ 5"x20  Heelslides 20x10"hold  amulating with SPC education 1 lap around clinic    Tiki received the following manual therapy techniques: Joint mobilizations and Soft tissue Mobilization were applied to the: Right knee for 25 minutes, including:    Superior<>inferior patella grade II-III  Anterior tibiafemoral joint mobs for extension   STM posterior knee and hamstring  Passive hamstring stretch     Tiki participated in neuromuscular re-education activities to improve: Balance and " Proprioception for 00 minutes. The following activities were included:      Tiki received cold pack for 00 minutes to Right knee.      Home Exercises Provided and Patient Education Provided     Education provided:   - Continue HEP  -     Written Home Exercises Provided: Patient instructed to cont prior HEP.  Exercises were reviewed and Tiki was able to demonstrate them prior to the end of the session.  Tiki demonstrated good  understanding of the education provided.     See EMR under Patient Instructions for exercises provided during therapy sessions. 8/5/22,     Assessment     Continued with manual techniques to promote improved knee extension as deficits remain present. Fair tolerance with manual and pt required a couple breaks during due to knee pain. Some improvement noted however pt continues to lack both active/passive extension at end of session today and will benefit from continued focus next visit. Pt unable to feel quad activation with NMES today with stimulation turned all the way up and was unable to be supervised by therapist therefore did not perform but will benefit from resuming next visit. Continued general ROM and quad strengthening exercises today without complaints of increased pain.       Tiki Is progressing well towards his goals.   Pt prognosis is Good.     Pt will continue to benefit from skilled outpatient physical therapy to address the deficits listed in the problem list box on initial evaluation, provide pt/family education and to maximize pt's level of independence in the home and community environment.     Pt's spiritual, cultural and educational needs considered and pt agreeable to plan of care and goals.     Anticipated barriers to physical therapy: anxiety    Short Term GOALS: 4 weeks. Pt agrees with goals set.   1. Patient demonstrates independence with HEP. Progressing, not met  2. Patient demonstrates increased right knee ROM 0  to 100 to improve tolerance to functional  activities pain free. Progressing, not met  3. Patient demonstrates ability to perform Timed up and Go in 20 seconds with LRAD to improve mobility and decrease risk for falls. Progressing, not met  4. Patient demonstrates ability to perform 15 sit to stands in 30 seconds to improve bilateral LE strength. Progressing, not met     Long Term GOALS: 8 weeks. Pt agrees with goals set.  1. Patient demonstrates increased right knee ROM 0 to 120 to improve tolerance to functional activities pain free.  Progressing, not met  2. Patient demonstrates increased strength BLE's to 4+/5 or greater to improve tolerance to functional activities pain free.  Progressing, not met  3. Patient demonstrates improved overall function per FOTO Knee Survey to 42% Limitation or less.  Progressing, not met  4. Patient demonstrates ability to perform Timed up and Go in 15 seconds or less with LRAD to improve mobility and decrease risk for falls. Progressing, not met  5. Patient demonstrates ability to walk 450ft in 2 minute with LRAD to improve mobility and endurance. Progressing, not met  6. Patient goal: patient demonstrates ability to kneel down onto ground and return to standing with little to no difficulty to PLOF and perform job tasks. Progressing, not met    Plan     Continue to improve knee extension    Yoel Flores, PTA  08/15/2022

## 2022-08-16 ENCOUNTER — CLINICAL SUPPORT (OUTPATIENT)
Dept: REHABILITATION | Facility: HOSPITAL | Age: 62
End: 2022-08-16
Attending: ORTHOPAEDIC SURGERY
Payer: OTHER GOVERNMENT

## 2022-08-16 DIAGNOSIS — R26.89 ANTALGIC GAIT: ICD-10-CM

## 2022-08-16 DIAGNOSIS — M25.661 DECREASED ROM OF RIGHT KNEE: Primary | ICD-10-CM

## 2022-08-16 DIAGNOSIS — R29.898 DECREASED STRENGTH OF LOWER EXTREMITY: ICD-10-CM

## 2022-08-16 PROCEDURE — 97140 MANUAL THERAPY 1/> REGIONS: CPT | Mod: PN,CQ

## 2022-08-16 PROCEDURE — 97110 THERAPEUTIC EXERCISES: CPT | Mod: PN,CQ

## 2022-08-17 NOTE — PROGRESS NOTES
"  Physical Therapy Daily Treatment Note     Name: Tiki Willoughby  Clinic Number: [id number]    Therapy Diagnosis:   Encounter Diagnoses   Name Primary?    Decreased ROM of right knee Yes    Decreased strength of lower extremity     Antalgic gait      Physician: Stevo Kohler MD    Visit Date: 8/18/2022    Physician Orders: PT Eval and Treat   Medical Diagnosis from Referral: Z96.651 (ICD-10-CM) - Status post right knee replacement  Evaluation Date: 8/5/2022    PN Due: 9/5/22  Authorization Period Expiration: 2/05/2023  Plan of Care Expiration: 11/5/22  Visit # / Visits authorized: 4/ 15 (+eval)  PTA Visit #: 2/5     Time In: 0905  Time Out: 1005  Total Billable Time: 35 minutes 1:1 c/ PTA     Precautions: Diabetes and HTN      S/p right TKA Dr. Kohler 7/14/22  POW 5 (8/18-8/25)    Subjective     Pt reports: Pt denies increased pain following last session. Has no new complaints.   He was compliant with home exercise program.  Response to previous treatment: no adverse response  Functional change: no changes noted    Pain: 3/10  Location: right knee      Objective     8/18/2022    Knee extension at beginning of session: lacking 10*     After manual: Lacking 5*    AAROM knee flexion= 107    Tiki received therapeutic exercises to develop strength, ROM and flexibility for 35 minutes including: bolded = performed    Nustep 6' (beginning of session)    Heel prop 5' 3#  Quad set c/ heel prop +Russian 10" hold x 10 minutes   Calf stretch c/ heel prop 30"x4  LAQ 5" hold 20x  SAQ 5"x20  Heelslides 20x10"hold  amulating with SPC education 1 lap around clinic    Tiki received the following manual therapy techniques: Joint mobilizations and Soft tissue Mobilization were applied to the: Right knee for 25 minutes, including:    Superior<>inferior patella grade II-III  Anterior tibiafemoral joint mobs for extension   STM posterior knee and hamstring  Passive hamstring stretch     Tiki participated in neuromuscular " re-education activities to improve: Balance and Proprioception for 00 minutes. The following activities were included:      Tiki received cold pack for 00 minutes to Right knee.      Home Exercises Provided and Patient Education Provided     Education provided:   - Continue HEP  -     Written Home Exercises Provided: Patient instructed to cont prior HEP.  Exercises were reviewed and Tiki was able to demonstrate them prior to the end of the session.  Tiki demonstrated good  understanding of the education provided.     See EMR under Patient Instructions for exercises provided during therapy sessions. 8/5/22,     Assessment     Pt has difficulty isolating quad contraction with quad sets and uses hamstring/glutes to compensate. Slight improvement with verbal/tactile cues but continues with compensatory patterns overall. Resumed Estim today without complaints. Better response to manual intervention today with greater improvement in extension however continues to lack full active/passive extension at this time.    Did not progress exercises today as majority of time spent on manual and continued focus of improving ROM with current exercises.     Tiki Is progressing well towards his goals.   Pt prognosis is Good.     Pt will continue to benefit from skilled outpatient physical therapy to address the deficits listed in the problem list box on initial evaluation, provide pt/family education and to maximize pt's level of independence in the home and community environment.     Pt's spiritual, cultural and educational needs considered and pt agreeable to plan of care and goals.     Anticipated barriers to physical therapy: anxiety    Short Term GOALS: 4 weeks. Pt agrees with goals set.   1. Patient demonstrates independence with HEP. Progressing, not met  2. Patient demonstrates increased right knee ROM 0  to 100 to improve tolerance to functional activities pain free. Progressing, not met  3. Patient demonstrates  ability to perform Timed up and Go in 20 seconds with LRAD to improve mobility and decrease risk for falls. Progressing, not met  4. Patient demonstrates ability to perform 15 sit to stands in 30 seconds to improve bilateral LE strength. Progressing, not met     Long Term GOALS: 8 weeks. Pt agrees with goals set.  1. Patient demonstrates increased right knee ROM 0 to 120 to improve tolerance to functional activities pain free.  Progressing, not met  2. Patient demonstrates increased strength BLE's to 4+/5 or greater to improve tolerance to functional activities pain free.  Progressing, not met  3. Patient demonstrates improved overall function per FOTO Knee Survey to 42% Limitation or less.  Progressing, not met  4. Patient demonstrates ability to perform Timed up and Go in 15 seconds or less with LRAD to improve mobility and decrease risk for falls. Progressing, not met  5. Patient demonstrates ability to walk 450ft in 2 minute with LRAD to improve mobility and endurance. Progressing, not met  6. Patient goal: patient demonstrates ability to kneel down onto ground and return to standing with little to no difficulty to PLOF and perform job tasks. Progressing, not met    Plan     Continue to improve knee extension    Yoel Flores, PTA  08/18/2022

## 2022-08-18 ENCOUNTER — CLINICAL SUPPORT (OUTPATIENT)
Dept: REHABILITATION | Facility: HOSPITAL | Age: 62
End: 2022-08-18
Attending: ORTHOPAEDIC SURGERY
Payer: OTHER GOVERNMENT

## 2022-08-18 DIAGNOSIS — R26.89 ANTALGIC GAIT: ICD-10-CM

## 2022-08-18 DIAGNOSIS — R29.898 DECREASED STRENGTH OF LOWER EXTREMITY: ICD-10-CM

## 2022-08-18 DIAGNOSIS — M25.661 DECREASED ROM OF RIGHT KNEE: Primary | ICD-10-CM

## 2022-08-18 PROCEDURE — 97110 THERAPEUTIC EXERCISES: CPT | Mod: PN,CQ

## 2022-08-18 PROCEDURE — 97140 MANUAL THERAPY 1/> REGIONS: CPT | Mod: PN,CQ

## 2022-08-22 NOTE — PROGRESS NOTES
"  Physical Therapy Daily Treatment Note     Name: Tiki Willoughby  Clinic Number: [id number]    Therapy Diagnosis:   Encounter Diagnoses   Name Primary?    Decreased ROM of right knee Yes    Decreased strength of lower extremity     Antalgic gait      Physician: Stevo Kohler MD    Visit Date: 8/23/2022    Physician Orders: PT Eval and Treat   Medical Diagnosis from Referral: Z96.651 (ICD-10-CM) - Status post right knee replacement  Evaluation Date: 8/5/2022    PN Due: 9/5/22  Authorization Period Expiration: 2/05/2023  Plan of Care Expiration: 11/5/22  Visit # / Visits authorized: 5/ 15 (+eval)  PTA Visit #: 3/5     Time In: 0805  Time Out: 0900  Total Billable Time: 55 minutes     Precautions: Diabetes and HTN      S/p right TKA Dr. Kohler 7/14/22  POW 5 (8/18-8/25)    Subjective     Pt reports: Pt states he is feeling better and continues to do exercises at home. Pt states he started using a weight with heel prop at home to help with straightening his knee.   He was compliant with home exercise program.  Response to previous treatment: no adverse response  Functional change: no changes noted    Pain: 3/10  Location: right knee      Objective     8/23/2022    Knee extension at beginning of session: lacking 7*     After manual: Lacking 4*    AAROM knee flexion= 107    Tiki received therapeutic exercises to develop strength, ROM and flexibility for 35 minutes including: bolded = performed    Nustep 6' (beginning of session)    Heel prop 5' 3# (with ice)  Quad set c/ heel prop +Russian 10" hold x 5 minutes   Calf stretch c/ heel prop 30"x4  LAQ 5" hold 20x  SAQ with small bolster with Russian 10" hold x 5 minutes  Heelslides 20x10"hold  +recumbent bike, seat 14, 5min at end of session for ROM    Tiki received the following manual therapy techniques: Joint mobilizations and Soft tissue Mobilization were applied to the: Right knee for 25 minutes, including:    Superior<>inferior patella grade II-III  Anterior " tibiafemoral joint mobs for extension   IASTM posterior knee and hamstring with prone knee hang  Passive hamstring stretch     Tiki participated in neuromuscular re-education activities to improve: Balance and Proprioception for 00 minutes. The following activities were included:      Tiki received cold pack for 05 minutes to Right knee       Home Exercises Provided and Patient Education Provided     Education provided:   - issued/reviewed updated HEP to add to current home program  -     Written Home Exercises Provided: Patient instructed to cont prior HEP.  Exercises were reviewed and Tiki was able to demonstrate them prior to the end of the session.  Tiki demonstrated good  understanding of the education provided.     See EMR under Patient Instructions for exercises provided during therapy sessions. 8/5/22, 8/23/22,    Assessment     Pt's active knee extension is slowly improving however he continues to lack full extension which may be contributed to swelling remaining present in knee. Continued with manual techniques to promote improved extension with some improvement noted post intervention. Overall, improved tolerance to manual therapy with less breaks required due to increased pain. Added [place] Estim to SAQ today to promote improved quad recruitment patterns with exercise and pt performed without complaints. Did not progress exercises today as majority of time spent on manual and continued focus of improving ROM with current exercises. Added recumbent bike for 5 minutes at end of session for additional ROM.       Tiki Is progressing well towards his goals.   Pt prognosis is Good.     Pt will continue to benefit from skilled outpatient physical therapy to address the deficits listed in the problem list box on initial evaluation, provide pt/family education and to maximize pt's level of independence in the home and community environment.     Pt's spiritual, cultural and educational needs considered  and pt agreeable to plan of care and goals.     Anticipated barriers to physical therapy: anxiety    Short Term GOALS: 4 weeks. Pt agrees with goals set.   1. Patient demonstrates independence with HEP. Progressing, not met  2. Patient demonstrates increased right knee ROM 0  to 100 to improve tolerance to functional activities pain free. Progressing, not met  3. Patient demonstrates ability to perform Timed up and Go in 20 seconds with LRAD to improve mobility and decrease risk for falls. Progressing, not met  4. Patient demonstrates ability to perform 15 sit to stands in 30 seconds to improve bilateral LE strength. Progressing, not met     Long Term GOALS: 8 weeks. Pt agrees with goals set.  1. Patient demonstrates increased right knee ROM 0 to 120 to improve tolerance to functional activities pain free.  Progressing, not met  2. Patient demonstrates increased strength BLE's to 4+/5 or greater to improve tolerance to functional activities pain free.  Progressing, not met  3. Patient demonstrates improved overall function per FOTO Knee Survey to 42% Limitation or less.  Progressing, not met  4. Patient demonstrates ability to perform Timed up and Go in 15 seconds or less with LRAD to improve mobility and decrease risk for falls. Progressing, not met  5. Patient demonstrates ability to walk 450ft in 2 minute with LRAD to improve mobility and endurance. Progressing, not met  6. Patient goal: patient demonstrates ability to kneel down onto ground and return to standing with little to no difficulty to PLOF and perform job tasks. Progressing, not met    Plan     Continue to improve knee extension    Yoel Flores, PTA  08/23/2022

## 2022-08-23 ENCOUNTER — CLINICAL SUPPORT (OUTPATIENT)
Dept: REHABILITATION | Facility: HOSPITAL | Age: 62
End: 2022-08-23
Attending: ORTHOPAEDIC SURGERY
Payer: OTHER GOVERNMENT

## 2022-08-23 DIAGNOSIS — R29.898 DECREASED STRENGTH OF LOWER EXTREMITY: ICD-10-CM

## 2022-08-23 DIAGNOSIS — M25.661 DECREASED ROM OF RIGHT KNEE: Primary | ICD-10-CM

## 2022-08-23 DIAGNOSIS — R26.89 ANTALGIC GAIT: ICD-10-CM

## 2022-08-23 PROCEDURE — 97110 THERAPEUTIC EXERCISES: CPT | Mod: PN,CQ

## 2022-08-23 PROCEDURE — 97140 MANUAL THERAPY 1/> REGIONS: CPT | Mod: PN,CQ

## 2022-08-24 NOTE — PROGRESS NOTES
"  Physical Therapy Daily Treatment Note     Name: Tiki Willoughby  Clinic Number: [id number]    Therapy Diagnosis:   Encounter Diagnoses   Name Primary?    Decreased ROM of right knee Yes    Decreased strength of lower extremity     Antalgic gait      Physician: Stevo Kohler MD    Visit Date: 8/25/2022    Physician Orders: PT Eval and Treat   Medical Diagnosis from Referral: Z96.651 (ICD-10-CM) - Status post right knee replacement  Evaluation Date: 8/5/2022    PN Due: 9/5/22  Authorization Period Expiration: 2/05/2023  Plan of Care Expiration: 11/5/22  Visit # / Visits authorized: 6/ 15 (+eval)  PTA Visit #: 0/5     Time In: 0708a  Time Out: 0815a  Total Billable Time: 62 minutes (5 minutes of ice not billed)     Precautions: Diabetes and HTN      S/p right TKA Dr. Kohler 7/14/22  POW 6 (8/25-9/1)    Subjective     Pt reports: He is no longer taking his pain medication.   He was compliant with home exercise program.  Response to previous treatment: no adverse response  Functional change: no changes noted    Pain: 0/10  Location: right knee      Objective     8/23/2022    Knee extension at beginning of session: lacking 9*     After manual: Lacking 3*    AAROM knee flexion= 107    Tiki received therapeutic exercises to develop strength, ROM and flexibility for 27 minutes including: bolded = performed    Heel prop 5' 3# (with ice)  Quad set c/ heel prop +Russian 10" hold x 5 minutes   Calf stretch c/ heel prop 30"x4  LAQ 5" hold 20x +1#  SAQ with small bolster with Russian 10" hold x 5 minutes  Heelslides 20x10"hold  recumbent bike, seat 14, 8 min at beginning of session for ROM (seated 16 today)    Tiki received the following manual therapy techniques: Joint mobilizations and Soft tissue Mobilization were applied to the: Right knee for 35 minutes, including:    Superior<>inferior patella grade II-III  Anterior tibiafemoral joint mobs for extension   Posterior tibiofemoral joint mobs for flexion  PROM knee " flexion with gapping   IASTM posterior knee and hamstring with prone knee hang  Passive hamstring stretch     Tiki participated in neuromuscular re-education activities to improve: Balance and Proprioception for 00 minutes. The following activities were included:      Tiki received cold pack for 05 minutes to Right knee       Home Exercises Provided and Patient Education Provided     Education provided:   - issued/reviewed updated HEP to add to current home program  -     Written Home Exercises Provided: Patient instructed to cont prior HEP.  Exercises were reviewed and Tiki was able to demonstrate them prior to the end of the session.  Tiki demonstrated good  understanding of the education provided.     See EMR under Patient Instructions for exercises provided during therapy sessions. 8/5/22, 8/23/22,    Assessment     Pt started on recumbent bike to improve ROM and stiffness. Due to starting with bike at beginning of session rather than at the end pt was not able to make full revolutions and required the seat further back. He continues to lack full knee extension but improves with manual techniques. Improved quad activation noted today with quad set, SAQ, and LAQ. Joint mobs added for improvements with knee flexion with good tolerance by pt.   Tiki Is progressing well towards his goals.   Pt prognosis is Good.     Pt will continue to benefit from skilled outpatient physical therapy to address the deficits listed in the problem list box on initial evaluation, provide pt/family education and to maximize pt's level of independence in the home and community environment.     Pt's spiritual, cultural and educational needs considered and pt agreeable to plan of care and goals.     Anticipated barriers to physical therapy: anxiety    Short Term GOALS: 4 weeks. Pt agrees with goals set.   1. Patient demonstrates independence with HEP. Progressing, not met  2. Patient demonstrates increased right knee ROM 0  to  100 to improve tolerance to functional activities pain free. Progressing, not met  3. Patient demonstrates ability to perform Timed up and Go in 20 seconds with LRAD to improve mobility and decrease risk for falls. Progressing, not met  4. Patient demonstrates ability to perform 15 sit to stands in 30 seconds to improve bilateral LE strength. Progressing, not met     Long Term GOALS: 8 weeks. Pt agrees with goals set.  1. Patient demonstrates increased right knee ROM 0 to 120 to improve tolerance to functional activities pain free.  Progressing, not met  2. Patient demonstrates increased strength BLE's to 4+/5 or greater to improve tolerance to functional activities pain free.  Progressing, not met  3. Patient demonstrates improved overall function per FOTO Knee Survey to 42% Limitation or less.  Progressing, not met  4. Patient demonstrates ability to perform Timed up and Go in 15 seconds or less with LRAD to improve mobility and decrease risk for falls. Progressing, not met  5. Patient demonstrates ability to walk 450ft in 2 minute with LRAD to improve mobility and endurance. Progressing, not met  6. Patient goal: patient demonstrates ability to kneel down onto ground and return to standing with little to no difficulty to PLOF and perform job tasks. Progressing, not met    Plan     Continue to improve knee extension    Meagan Smith, PT,DPT  08/25/2022

## 2022-08-25 ENCOUNTER — CLINICAL SUPPORT (OUTPATIENT)
Dept: REHABILITATION | Facility: HOSPITAL | Age: 62
End: 2022-08-25
Attending: ORTHOPAEDIC SURGERY
Payer: OTHER GOVERNMENT

## 2022-08-25 DIAGNOSIS — R29.898 DECREASED STRENGTH OF LOWER EXTREMITY: ICD-10-CM

## 2022-08-25 DIAGNOSIS — M25.661 DECREASED ROM OF RIGHT KNEE: Primary | ICD-10-CM

## 2022-08-25 DIAGNOSIS — R26.89 ANTALGIC GAIT: ICD-10-CM

## 2022-08-25 PROCEDURE — 97110 THERAPEUTIC EXERCISES: CPT | Mod: PN

## 2022-08-25 PROCEDURE — 97140 MANUAL THERAPY 1/> REGIONS: CPT | Mod: PN

## 2022-08-29 NOTE — PROGRESS NOTES
"  Physical Therapy Daily Treatment Note     Name: Tiki Willoughby  Clinic Number: [id number]    Therapy Diagnosis:   Encounter Diagnoses   Name Primary?    Decreased ROM of right knee Yes    Decreased strength of lower extremity     Antalgic gait        Physician: Stevo Kohler MD    Visit Date: 8/30/2022    Physician Orders: PT Eval and Treat   Medical Diagnosis from Referral: Z96.651 (ICD-10-CM) - Status post right knee replacement  Evaluation Date: 8/5/2022    PN Due: 9/5/22  Authorization Period Expiration: 2/05/2023  Plan of Care Expiration: 11/5/22  Visit # / Visits authorized: 7/ 15 (+eval)  PTA Visit #: 0/5     Time In: 0936a  Time Out: 1045a  Total Billable Time: 69 minutes      Precautions: Diabetes and HTN      S/p right TKA Dr. Kohler 7/14/22  POW 6 (8/25-9/1)    Subjective     Pt reports: He feels as though he is walking better.   He was compliant with home exercise program.  Response to previous treatment: no adverse response  Functional change: no changes noted    Pain: 2-3/10  Location: right knee      Objective     8/23/2022    Knee extension at beginning of session: lacking 9*     After manual: Lacking 3*    AAROM knee flexion= 107    Tiki received therapeutic exercises to develop strength, ROM and flexibility for 34 minutes including: bolded = performed    Heel prop 5' 3# (with ice)  Quad set c/ heel prop +Russian 10" hold x 5 minutes   Calf stretch on incline 30"x3  LAQ 5" hold 20x +1#  SAQ with small bolster 2x10  +step up 4" 2x10 RLE  +TKE red sport cord 5"x20  +small hurdles 3 laps step to  +sit to stand 18" plyo 2x10  Heelslides 20x10"hold  recumbent bike, seat 14, 5 min at end of session for ROM    Tiki received the following manual therapy techniques: Joint mobilizations and Soft tissue Mobilization were applied to the: Right knee for 35 minutes, including:    Superior<>inferior patella grade II-III  Anterior tibiafemoral joint mobs for extension   Posterior tibiofemoral joint mobs " for flexion  PROM knee flexion with gapping   IASTM posterior knee and hamstring with prone knee hang  Passive hamstring stretch     Tiki participated in neuromuscular re-education activities to improve: Balance and Proprioception for 00 minutes. The following activities were included:      Tiki received cold pack for 00 minutes to Right knee       Home Exercises Provided and Patient Education Provided     Education provided:   -     Written Home Exercises Provided: Patient instructed to cont prior HEP.  Exercises were reviewed and Tiki was able to demonstrate them prior to the end of the session.  Tiki demonstrated good  understanding of the education provided.     See EMR under Patient Instructions for exercises provided  during therapy sessions . 8/5/22, 8/23/22,    Assessment     PT continues to begin session with manual due to increase in swelling and decrease in ROM. Slight improvements noted with manual for knee extension. Gapping technique used for knee flexion to with fair tolerance by pt. Pt continues to display difficulty with activating quad for proper quad set and continues to engage posterior chain. PT added TKE and step ups for quad strengthening. PT also added small hurdles for proper gait mechanics. Pt required periodic use of parallel bars to stabilize when walking. He also required moderate Vcs for proper heel strike.   Tiki Is progressing well towards his goals.   Pt prognosis is Good.     Pt will continue to benefit from skilled outpatient physical therapy to address the deficits listed in the problem list box on initial evaluation, provide pt/family education and to maximize pt's level of independence in the home and community environment.     Pt's spiritual, cultural and educational needs considered and pt agreeable to plan of care and goals.     Anticipated barriers to physical therapy: anxiety    Short Term GOALS: 4 weeks. Pt agrees with goals set.   1. Patient demonstrates  independence with HEP. Progressing, not met  2. Patient demonstrates increased right knee ROM 0  to 100 to improve tolerance to functional activities pain free. Progressing, not met  3. Patient demonstrates ability to perform Timed up and Go in 20 seconds with LRAD to improve mobility and decrease risk for falls. Progressing, not met  4. Patient demonstrates ability to perform 15 sit to stands in 30 seconds to improve bilateral LE strength. Progressing, not met     Long Term GOALS: 8 weeks. Pt agrees with goals set.  1. Patient demonstrates increased right knee ROM 0 to 120 to improve tolerance to functional activities pain free.  Progressing, not met  2. Patient demonstrates increased strength BLE's to 4+/5 or greater to improve tolerance to functional activities pain free.  Progressing, not met  3. Patient demonstrates improved overall function per FOTO Knee Survey to 42% Limitation or less.  Progressing, not met  4. Patient demonstrates ability to perform Timed up and Go in 15 seconds or less with LRAD to improve mobility and decrease risk for falls. Progressing, not met  5. Patient demonstrates ability to walk 450ft in 2 minute with LRAD to improve mobility and endurance. Progressing, not met  6. Patient goal: patient demonstrates ability to kneel down onto ground and return to standing with little to no difficulty to PLOF and perform job tasks. Progressing, not met    Plan     Continue to improve knee extension and functional strength    Meagan Smith, PT,DPT  08/30/2022

## 2022-08-30 ENCOUNTER — CLINICAL SUPPORT (OUTPATIENT)
Dept: REHABILITATION | Facility: HOSPITAL | Age: 62
End: 2022-08-30
Payer: OTHER GOVERNMENT

## 2022-08-30 DIAGNOSIS — M25.661 DECREASED ROM OF RIGHT KNEE: Primary | ICD-10-CM

## 2022-08-30 DIAGNOSIS — R26.89 ANTALGIC GAIT: ICD-10-CM

## 2022-08-30 DIAGNOSIS — R29.898 DECREASED STRENGTH OF LOWER EXTREMITY: ICD-10-CM

## 2022-08-30 PROCEDURE — 97110 THERAPEUTIC EXERCISES: CPT | Mod: PN

## 2022-08-30 PROCEDURE — 97140 MANUAL THERAPY 1/> REGIONS: CPT | Mod: PN

## 2022-08-31 NOTE — PROGRESS NOTES
"  Physical Therapy Daily Treatment Note     Name: Tiki Willoughby  Clinic Number: [id number]    Therapy Diagnosis:   Encounter Diagnoses   Name Primary?    Decreased ROM of right knee Yes    Decreased strength of lower extremity     Antalgic gait          Physician: Stevo Kohler MD    Visit Date: 9/1/2022    Physician Orders: PT Eval and Treat   Medical Diagnosis from Referral: Z96.651 (ICD-10-CM) - Status post right knee replacement  Evaluation Date: 8/5/2022    PN Due: 10/1/22  Authorization Period Expiration: 2/05/2023  Plan of Care Expiration: 11/5/22  Visit # / Visits authorized: 8/ 15 (+eval)  PTA Visit #: 0/5     Time In: 0704a  Time Out: 0810  Total Billable Time: 66 minutes      Precautions: Diabetes and HTN      S/p right TKA Dr. Kohler 7/14/22  POW 7 (9/1-9/8)    Subjective     Pt reports: He is feeling stronger and like he is getting better each day  He was compliant with home exercise program.  Response to previous treatment: no adverse response  Functional change: no changes noted    Pain: 2-3/10  Location: right knee      Objective     9/1/22    CMS Impairment/Limitation/Restriction for FOTO knee Survey    Therapist reviewed FOTO scores for Tiki Willoughby on 9/1/2022.   FOTO documents entered into [employer] - see Media section.    Limitation Score: 44%     Knee extension at beginning of session: lacking 9*     After manual: Lacking 3*    AROM knee flexion beginning of session: 95  AROM knee flexion end of session= 107    Timed up and Go: 14 seconds with SPC  30 seconds sit to stand: 12 without hands  2 MWT: 392 ft with SPC    Tiki received therapeutic exercises to develop strength, ROM and flexibility for 36 minutes including: bolded = performed    Heel prop 5' 3# (with ice)  Quad set c/ heel prop +Russian 10" hold x 5 minutes   Calf stretch on incline 30"x3  LAQ 5" hold 20x +2#  SAQ with small bolster 2x10  step up 4" 2x10 RLE  +shuttle 2 bands 2x10  +single leg shuttle (R) 1 red band x10  TKE red " "sport cord 5"x20  small hurdles 3 laps step to gait pattern  sit to stand 18" plyo 2x10  Heelslides 20x10"hold  recumbent bike, seat 14, 5 min at end of session for ROM    Tiki received the following manual therapy techniques: Joint mobilizations and Soft tissue Mobilization were applied to the: Right knee for 30 minutes, including:    Superior<>inferior patella grade II-III  Anterior tibiafemoral joint mobs for extension   Posterior tibiofemoral joint mobs for flexion  PROM knee flexion with gapping   IASTM posterior knee and hamstring with prone knee hang  Passive hamstring stretch     Tiki participated in neuromuscular re-education activities to improve: Balance and Proprioception for 00 minutes. The following activities were included:      Tiki received cold pack for 00 minutes to Right knee       Home Exercises Provided and Patient Education Provided     Education provided:   -     Written Home Exercises Provided: Patient instructed to cont prior HEP.  Exercises were reviewed and Tiki was able to demonstrate them prior to the end of the session.  Tiki demonstrated good  understanding of the education provided.     See EMR under Patient Instructions for exercises provided  during therapy sessions . 8/5/22, 8/23/22,    Assessment     Pt was reassessed today and displayed improvements with Timed up and Go and 30 second sit to stand indicating improvements in mobility and functional strength. He continues to ambulate with SPC with proper gait pattern. Hurdles utilized again today to improve heel strike without AD. He continues to require moderate verbal and visual cues to improve weight bearing on RLE with sit to stands. PT added shuttle squats for continued ROM and strengthening. Pt was challenged with single leg shuttle and required verbal cues to full extend RLE into quad set for strengthening. At this time pain is patient's biggest limiting factor in achieving full knee flexion.   Tiki Is " progressing well towards his goals.   Pt prognosis is Good.     Pt will continue to benefit from skilled outpatient physical therapy to address the deficits listed in the problem list box on initial evaluation, provide pt/family education and to maximize pt's level of independence in the home and community environment.     Pt's spiritual, cultural and educational needs considered and pt agreeable to plan of care and goals.     Anticipated barriers to physical therapy: anxiety    Short Term GOALS: 4 weeks. Pt agrees with goals set.   1. Patient demonstrates independence with HEP. met  2. Patient demonstrates increased right knee ROM 0  to 100 to improve tolerance to functional activities pain free. Progressing, not met  3. Patient demonstrates ability to perform Timed up and Go in 20 seconds with LRAD to improve mobility and decrease risk for falls. met  4. Patient demonstrates ability to perform 15 sit to stands in 30 seconds to improve bilateral LE strength. Progressing, not met     Long Term GOALS: 8 weeks. Pt agrees with goals set.  1. Patient demonstrates increased right knee ROM 0 to 120 to improve tolerance to functional activities pain free.  Progressing, not met  2. Patient demonstrates increased strength BLE's to 4+/5 or greater to improve tolerance to functional activities pain free.  Progressing, not met  3. Patient demonstrates improved overall function per FOTO Knee Survey to 42% Limitation or less.  Progressing, not met  4. Patient demonstrates ability to perform Timed up and Go in 15 seconds or less with LRAD to improve mobility and decrease risk for falls. met  5. Patient demonstrates ability to walk 450ft in 2 minute with LRAD to improve mobility and endurance. Progressing, not met  6. Patient goal: patient demonstrates ability to kneel down onto ground and return to standing with little to no difficulty to PLOF and perform job tasks. Progressing, not met    Plan     Continue to improve knee  extension and functional strength    Meagan Smith, PT,DPT  09/01/2022

## 2022-09-01 ENCOUNTER — CLINICAL SUPPORT (OUTPATIENT)
Dept: REHABILITATION | Facility: HOSPITAL | Age: 62
End: 2022-09-01
Payer: OTHER GOVERNMENT

## 2022-09-01 DIAGNOSIS — M25.661 DECREASED ROM OF RIGHT KNEE: Primary | ICD-10-CM

## 2022-09-01 DIAGNOSIS — R29.898 DECREASED STRENGTH OF LOWER EXTREMITY: ICD-10-CM

## 2022-09-01 DIAGNOSIS — R26.89 ANTALGIC GAIT: ICD-10-CM

## 2022-09-01 PROCEDURE — 97110 THERAPEUTIC EXERCISES: CPT | Mod: PN

## 2022-09-01 PROCEDURE — 97140 MANUAL THERAPY 1/> REGIONS: CPT | Mod: PN

## 2022-09-08 NOTE — PROGRESS NOTES
"  Physical Therapy Daily Treatment Note     Name: Tiki Willoughby  Clinic Number: [id number]    Therapy Diagnosis:   Encounter Diagnoses   Name Primary?    Decreased ROM of right knee Yes    Decreased strength of lower extremity     Antalgic gait            Physician: Stevo Kohler MD    Visit Date: 9/9/2022    Physician Orders: PT Eval and Treat   Medical Diagnosis from Referral: Z96.651 (ICD-10-CM) - Status post right knee replacement  Evaluation Date: 8/5/2022    PN Due: 10/1/22  Authorization Period Expiration: 2/05/2023  Plan of Care Expiration: 11/5/22  Visit # / Visits authorized: 9/ 15 (+eval)  PTA Visit #: 1/5     Time In: 0805  Time Out: 0910  Total Billable Time: 65 minutes      Precautions: Diabetes and HTN      S/p right TKA Dr. Kohler 7/14/22  POW 7 (9/1-9/8)    Subjective     Pt reports: Pt states  he continues to feel better.   He was compliant with home exercise program.  Response to previous treatment: no adverse response  Functional change: no changes noted    Pain: 2-3/10  Location: right knee      Objective     9/1/22      Knee extension at beginning of session: lacking 7*     After manual: Lacking 3*    AROM knee flexion beginning of session: 95  AROM knee flexion end of session= 107        Tiki received therapeutic exercises to develop strength, ROM and flexibility for 40 minutes including: bolded = performed    Heel prop 5' 3# (with ice)  Quad set c/ heel prop +Russian 10" hold x 5 minutes   Calf stretch on incline 30"x3  LAQ 5" hold 20x 2#  SAQ with small bolster 2x10  step up +6" 2x10 RLE  shuttle 2 bands 2x10  single leg shuttle (R) 1 red band x10  TKE red sport cord 5"x20  small hurdles 3 laps step to gait pattern  sit to stand 18" plyo 2x10  Heelslides 20x10"hold  recumbent bike, seat 14, 5 min at end of session for ROM    Tiki received the following manual therapy techniques: Joint mobilizations and Soft tissue Mobilization were applied to the: Right knee for 25 minutes, " "including:    Superior<>inferior patella grade II-III  Anterior tibiafemoral joint mobs for extension   Posterior tibiofemoral joint mobs for flexion  PROM knee flexion with gapping   IASTM posterior knee and hamstring with prone knee hang  Passive hamstring stretch     Tiki participated in neuromuscular re-education activities to improve: Balance and Proprioception for 00 minutes. The following activities were included:      Tiki received cold pack for 00 minutes to Right knee       Home Exercises Provided and Patient Education Provided     Education provided:   - added TKE to HEP     Written Home Exercises Provided: Patient instructed to cont prior HEP.  Exercises were reviewed and Tiki was able to demonstrate them prior to the end of the session.  Tiki demonstrated good  understanding of the education provided.     See EMR under Patient Instructions for exercises provided  during therapy sessions . 8/5/22, 8/23/22, 9/9/22    Assessment     Pt arrived to therapy with complaints of increased stiffness today and states he thinks it is due to only attending therapy once this week. No improvement in ROM noted from previous visit however he was able to maintain ROM from last visit. He verbalized HEP compliance. Pt instructed to gradually lower legs on shuttle today to ensure optimal knee flexion and he admits increased soreness but was able to complete with increased ROM. Pt also able to progress to 6" step up today but continues to require some UE assistance as well as VC for ensure full extension. Overall he continues to lack full ROM but will continue addressing with manual intervention and ROM/strengthening exercises as tolerated.     Tiki Is progressing well towards his goals.   Pt prognosis is Good.     Pt will continue to benefit from skilled outpatient physical therapy to address the deficits listed in the problem list box on initial evaluation, provide pt/family education and to maximize pt's level " of independence in the home and community environment.     Pt's spiritual, cultural and educational needs considered and pt agreeable to plan of care and goals.     Anticipated barriers to physical therapy: anxiety    Short Term GOALS: 4 weeks. Pt agrees with goals set.   1. Patient demonstrates independence with HEP. met  2. Patient demonstrates increased right knee ROM 0  to 100 to improve tolerance to functional activities pain free. Progressing, not met  3. Patient demonstrates ability to perform Timed up and Go in 20 seconds with LRAD to improve mobility and decrease risk for falls. met  4. Patient demonstrates ability to perform 15 sit to stands in 30 seconds to improve bilateral LE strength. Progressing, not met     Long Term GOALS: 8 weeks. Pt agrees with goals set.  1. Patient demonstrates increased right knee ROM 0 to 120 to improve tolerance to functional activities pain free.  Progressing, not met  2. Patient demonstrates increased strength BLE's to 4+/5 or greater to improve tolerance to functional activities pain free.  Progressing, not met  3. Patient demonstrates improved overall function per FOTO Knee Survey to 42% Limitation or less.  Progressing, not met  4. Patient demonstrates ability to perform Timed up and Go in 15 seconds or less with LRAD to improve mobility and decrease risk for falls. met  5. Patient demonstrates ability to walk 450ft in 2 minute with LRAD to improve mobility and endurance. Progressing, not met  6. Patient goal: patient demonstrates ability to kneel down onto ground and return to standing with little to no difficulty to PLOF and perform job tasks. Progressing, not met    Plan     Continue to improve knee extension and functional strength    Yoel Flores, PTA,  09/09/2022

## 2022-09-09 ENCOUNTER — CLINICAL SUPPORT (OUTPATIENT)
Dept: REHABILITATION | Facility: HOSPITAL | Age: 62
End: 2022-09-09
Payer: OTHER GOVERNMENT

## 2022-09-09 DIAGNOSIS — R29.898 DECREASED STRENGTH OF LOWER EXTREMITY: ICD-10-CM

## 2022-09-09 DIAGNOSIS — M25.661 DECREASED ROM OF RIGHT KNEE: Primary | ICD-10-CM

## 2022-09-09 DIAGNOSIS — R26.89 ANTALGIC GAIT: ICD-10-CM

## 2022-09-09 PROCEDURE — 97140 MANUAL THERAPY 1/> REGIONS: CPT | Mod: PN,CQ

## 2022-09-09 PROCEDURE — 97110 THERAPEUTIC EXERCISES: CPT | Mod: PN,CQ

## 2022-09-12 ENCOUNTER — CLINICAL SUPPORT (OUTPATIENT)
Dept: REHABILITATION | Facility: HOSPITAL | Age: 62
End: 2022-09-12
Payer: OTHER GOVERNMENT

## 2022-09-12 DIAGNOSIS — R29.898 DECREASED STRENGTH OF LOWER EXTREMITY: ICD-10-CM

## 2022-09-12 DIAGNOSIS — R26.89 ANTALGIC GAIT: ICD-10-CM

## 2022-09-12 DIAGNOSIS — M25.661 DECREASED ROM OF RIGHT KNEE: Primary | ICD-10-CM

## 2022-09-12 PROCEDURE — 97140 MANUAL THERAPY 1/> REGIONS: CPT | Mod: PN,CQ

## 2022-09-12 PROCEDURE — 97110 THERAPEUTIC EXERCISES: CPT | Mod: PN,CQ

## 2022-09-12 NOTE — PROGRESS NOTES
"  Physical Therapy Daily Treatment Note     Name: Tiki Willoughby  Clinic Number: [id number]    Therapy Diagnosis:   Encounter Diagnoses   Name Primary?    Decreased ROM of right knee Yes    Decreased strength of lower extremity     Antalgic gait              Physician: Stevo Kohler MD    Visit Date: 9/12/2022    Physician Orders: PT Eval and Treat   Medical Diagnosis from Referral: Z96.651 (ICD-10-CM) - Status post right knee replacement  Evaluation Date: 8/5/2022    PN Due: 10/1/22  Authorization Period Expiration: 2/05/2023  Plan of Care Expiration: 11/5/22  Visit # / Visits authorized: 9/ 15 (+eval)  PTA Visit #: 1/5     Time In: 0845 (late)  Time Out: 0935  Total Billable Time: 50 minutes      Precautions: Diabetes and HTN      S/p right TKA Dr. Kohler 7/14/22  POW 8 (9/8-9/15)    Subjective     Pt reports: Pt has no new complaints. States he continues to work on exercises at home. Knee feels stiff/sore as usual.   He was compliant with home exercise program.  Response to previous treatment: no adverse response  Functional change: no changes noted    Pain: 2-3/10  Location: right knee      Objective     9/12/22    AROM post MT= (-4)-105  PROM 0-110      Tiki received therapeutic exercises to develop strength, ROM and flexibility for 30 minutes including: bolded = performed    Heel prop 5' 3# (with ice)  Quad set c/ heel prop +Russian 10" hold x 5 minutes   Calf stretch on incline 30"x3  LAQ 5" hold 20x 2#  SAQ 2# with small bolster 2x10  step up +6" 2x10 RLE  shuttle +2.5 bands +3x10  single leg shuttle (R) +1black +2x10  TKE red sport cord 5"x20  small hurdles 3 laps step to gait pattern  sit to stand 18" plyo 2x10  Heelslides 20x10"hold  recumbent bike, seat 14, 5 min at end of session for ROM    Tiki received the following manual therapy techniques: Joint mobilizations and Soft tissue Mobilization were applied to the: Right knee for 20 minutes, including:    Superior<>inferior patella grade " II-III  Anterior tibiafemoral joint mobs for extension   Posterior tibiofemoral joint mobs for flexion  PROM knee flexion with gapping   IASTM posterior knee and hamstring with prone knee hang  Passive hamstring stretch     Tiki participated in neuromuscular re-education activities to improve: Balance and Proprioception for 00 minutes. The following activities were included:      Tiki received cold pack for 00 minutes to Right knee       Home Exercises Provided and Patient Education Provided     Education provided:   -     Written Home Exercises Provided: Patient instructed to cont prior HEP.  Exercises were reviewed and Tiki was able to demonstrate them prior to the end of the session.  Tiki demonstrated good  understanding of the education provided.     See EMR under Patient Instructions for exercises provided  during therapy sessions . 8/5/22, 8/23/22, 9/9/22    Assessment     Pt able to achieve full passive extension today but continues to lack full active extension and has difficulty isolating quad contraction with quad sets. Continued with previous prescribed exercises focusing on knee ROM/strength and functional mobility. Pt was able to tolerate increased reps with double and single leg shuttle today with appropraite fatigue achieved. Continues to require cues for slow/controlled movement patterns but demonstrates improvement after cues.     Tiki Is progressing well towards his goals.   Pt prognosis is Good.     Pt will continue to benefit from skilled outpatient physical therapy to address the deficits listed in the problem list box on initial evaluation, provide pt/family education and to maximize pt's level of independence in the home and community environment.     Pt's spiritual, cultural and educational needs considered and pt agreeable to plan of care and goals.     Anticipated barriers to physical therapy: anxiety    Short Term GOALS: 4 weeks. Pt agrees with goals set.   1. Patient  demonstrates independence with HEP. met  2. Patient demonstrates increased right knee ROM 0  to 100 to improve tolerance to functional activities pain free. Progressing, not met  3. Patient demonstrates ability to perform Timed up and Go in 20 seconds with LRAD to improve mobility and decrease risk for falls. met  4. Patient demonstrates ability to perform 15 sit to stands in 30 seconds to improve bilateral LE strength. Progressing, not met     Long Term GOALS: 8 weeks. Pt agrees with goals set.  1. Patient demonstrates increased right knee ROM 0 to 120 to improve tolerance to functional activities pain free.  Progressing, not met  2. Patient demonstrates increased strength BLE's to 4+/5 or greater to improve tolerance to functional activities pain free.  Progressing, not met  3. Patient demonstrates improved overall function per FOTO Knee Survey to 42% Limitation or less.  Progressing, not met  4. Patient demonstrates ability to perform Timed up and Go in 15 seconds or less with LRAD to improve mobility and decrease risk for falls. met  5. Patient demonstrates ability to walk 450ft in 2 minute with LRAD to improve mobility and endurance. Progressing, not met  6. Patient goal: patient demonstrates ability to kneel down onto ground and return to standing with little to no difficulty to PLOF and perform job tasks. Progressing, not met    Plan     Continue to improve knee extension and functional strength    Yoel Flores, PTA,  09/12/2022

## 2022-09-15 ENCOUNTER — CLINICAL SUPPORT (OUTPATIENT)
Dept: REHABILITATION | Facility: HOSPITAL | Age: 62
End: 2022-09-15
Payer: OTHER GOVERNMENT

## 2022-09-15 DIAGNOSIS — M25.661 DECREASED ROM OF RIGHT KNEE: Primary | ICD-10-CM

## 2022-09-15 DIAGNOSIS — R26.89 ANTALGIC GAIT: ICD-10-CM

## 2022-09-15 DIAGNOSIS — R29.898 DECREASED STRENGTH OF LOWER EXTREMITY: ICD-10-CM

## 2022-09-15 PROCEDURE — 97110 THERAPEUTIC EXERCISES: CPT | Mod: PN,CQ

## 2022-09-15 PROCEDURE — 97140 MANUAL THERAPY 1/> REGIONS: CPT | Mod: PN,CQ

## 2022-09-15 NOTE — PROGRESS NOTES
"  Physical Therapy Daily Treatment Note     Name: Tiki Willoughby  Clinic Number: [id number]    Therapy Diagnosis:   Encounter Diagnoses   Name Primary?    Decreased ROM of right knee Yes    Decreased strength of lower extremity     Antalgic gait                Physician: Stevo Kohler MD    Visit Date: 9/15/2022    Physician Orders: PT Eval and Treat   Medical Diagnosis from Referral: Z96.651 (ICD-10-CM) - Status post right knee replacement  Evaluation Date: 8/5/2022    PN Due: 10/1/22  Authorization Period Expiration: 2/05/2023  Plan of Care Expiration: 11/5/22  Visit # / Visits authorized: 11/ 15 (+eval)  PTA Visit #: 3/5     Time In: 0815 (late)  Time Out: 0915  Total Billable Time: 60 minutes      Precautions: Diabetes and HTN      S/p right TKA Dr. Kohler 7/14/22  POW 9 (9/15-9/22)    Subjective     Pt reports: Pt states he was running late this morning so didn't get to do any stretches. Pt states his knee feels stiff but not really having pain.   He was compliant with home exercise program.  Response to previous treatment: no adverse response  Functional change: no changes noted    Pain: 2-3/10  Location: right knee      Objective     9/15/22  PROM (-2)-105      Tiki received therapeutic exercises to develop strength, ROM and flexibility for 45 minutes including: bolded = performed    Heel prop 5' 3# (with ice)  Quad set c/ heel prop +Russian 10" hold x 5 minutes   Calf stretch on incline 30"x3  LAQ 5" hold 20x 2# (5 minutes with Russian E-stim 5/5)  SAQ 2# with small bolster 2x10 ( 5 minutes with Russian E-stim 5/5)  step up 6" 2x10 RLE  shuttle +2.5 bands +3x10  single leg shuttle (R) +1.5 cords +3x10  TKE red sport cord 5"x20 ( 5 minutes with Russian E-stim 5/5)  small hurdles 3 laps step to gait pattern  sit to stand 18" plyo +3x10 +10#kb  Heelslides 20x10"hold  recumbent bike, seat 14, 5 min at end of session for ROM          Tiki received the following manual therapy techniques: Joint " mobilizations and Soft tissue Mobilization were applied to the: Right knee for 15 minutes, including:    Superior<>inferior patella grade II-III  Anterior tibiafemoral joint mobs for extension   Posterior tibiofemoral joint mobs for flexion  PROM knee flexion with gapping   IASTM posterior knee and hamstring with prone knee hang  Passive hamstring stretch     Tiki participated in neuromuscular re-education activities to improve: Balance and Proprioception for 00 minutes. The following activities were included:      Tiki received cold pack for 00 minutes to Right knee       Home Exercises Provided and Patient Education Provided     Education provided:   -     Written Home Exercises Provided: Patient instructed to cont prior HEP.  Exercises were reviewed and Tiki was able to demonstrate them prior to the end of the session.  Tiki demonstrated good  understanding of the education provided.     See EMR under Patient Instructions for exercises provided  during therapy sessions . 8/5/22, 8/23/22, 9/9/22    Assessment     Pt's AROM knee flexion/extension remains limited overall but slowly progressing. Decreased soft tissue restrictions and improved joint mobility noted indicating active extension being mainly limited by quad strength. Performed Russian E-stim with various quad strengthening exercises today to improve quad recruitment patterns as pt continues to have difficulty isolating quad contraction. Pt remains compliant with HEP and remains appropriate for skilled PT to improve knee ROM, strength, and functional mobility.     Tiki Is progressing well towards his goals.   Pt prognosis is Good.     Pt will continue to benefit from skilled outpatient physical therapy to address the deficits listed in the problem list box on initial evaluation, provide pt/family education and to maximize pt's level of independence in the home and community environment.     Pt's spiritual, cultural and educational needs  considered and pt agreeable to plan of care and goals.     Anticipated barriers to physical therapy: anxiety    Short Term GOALS: 4 weeks. Pt agrees with goals set.   1. Patient demonstrates independence with HEP. met  2. Patient demonstrates increased right knee ROM 0  to 100 to improve tolerance to functional activities pain free. Progressing, not met  3. Patient demonstrates ability to perform Timed up and Go in 20 seconds with LRAD to improve mobility and decrease risk for falls. met  4. Patient demonstrates ability to perform 15 sit to stands in 30 seconds to improve bilateral LE strength. Progressing, not met     Long Term GOALS: 8 weeks. Pt agrees with goals set.  1. Patient demonstrates increased right knee ROM 0 to 120 to improve tolerance to functional activities pain free.  Progressing, not met  2. Patient demonstrates increased strength BLE's to 4+/5 or greater to improve tolerance to functional activities pain free.  Progressing, not met  3. Patient demonstrates improved overall function per FOTO Knee Survey to 42% Limitation or less.  Progressing, not met  4. Patient demonstrates ability to perform Timed up and Go in 15 seconds or less with LRAD to improve mobility and decrease risk for falls. met  5. Patient demonstrates ability to walk 450ft in 2 minute with LRAD to improve mobility and endurance. Progressing, not met  6. Patient goal: patient demonstrates ability to kneel down onto ground and return to standing with little to no difficulty to PLOF and perform job tasks. Progressing, not met    Plan     Continue to improve knee extension and functional strength    Yoel Flores, PTA  09/15/2022

## 2022-09-20 NOTE — PROGRESS NOTES
"  Physical Therapy Daily Treatment Note     Name: Tiki Willoughby  Clinic Number: [id number]    Therapy Diagnosis:   Encounter Diagnoses   Name Primary?    Decreased ROM of right knee Yes    Decreased strength of lower extremity     Antalgic gait        Physician: Stevo Kohler MD    Visit Date: 9/21/2022    Physician Orders: PT Eval and Treat   Medical Diagnosis from Referral: Z96.651 (ICD-10-CM) - Status post right knee replacement  Evaluation Date: 8/5/2022    PN Due: 10/1/22  Authorization Period Expiration: 2/05/2023  Plan of Care Expiration: 11/5/22  Visit # / Visits authorized: 12/ 15 (+eval)  PTA Visit #: 0/5     Time In: 0805a  Time Out: 0921a  Total Billable Time: 76 minutes      Precautions: Diabetes and HTN      S/p right TKA Dr. Kohler 7/14/22  POW 9 (9/15-9/22)    Subjective     Pt reports: His swelling has been getting better since using his ice packs. His big goal is he wants to be able to navigate the [employer] to go to his [employer] football games.   He was compliant with home exercise program.  Response to previous treatment: no adverse response  Functional change: no changes noted    Pain: 2-3/10  Location: right knee      Objective     9/21/22  PROM (-2)-105      Tiki received therapeutic exercises to develop strength, ROM and flexibility for 50 minutes including: bolded = performed      LAQ 2# (5 minutes with Russian E-stim 5/5)  SAQ ( 5 minutes with Russian E-stim 5/5)  step up 6" +3x10 RLE    single leg shuttle (R) +1.5 cords +3x10  +shuttle kick back 1 band 2x10 ea  TKE red sport cord 5"x20 ( 5 minutes with Russian E-stim 5/5)    sit to stand 18" plyo +3x10 +10#kb (staggered stance with RLE in back to improve weight bearing)  Heelslides 20x10"hold  Nustep seat 9 end of session for ROM 5 minutes      Tiki received the following manual therapy techniques: Joint mobilizations and Soft tissue Mobilization were applied to the: Right knee for 25 minutes, including:    Superior<>inferior " patella grade II-III at end ranges  Anterior tibiafemoral joint mobs for extension  at end range  Posterior tibiofemoral joint mobs for flexion at end range      Tiki received cold pack for 00 minutes to Right knee       Home Exercises Provided and Patient Education Provided     Education provided:   -     Written Home Exercises Provided: Patient instructed to cont prior HEP.  Exercises were reviewed and Tiki was able to demonstrate them prior to the end of the session.  Tiki demonstrated good  understanding of the education provided.     See EMR under Patient Instructions for exercises provided  during therapy sessions . 8/5/22, 8/23/22, 9/9/22    Assessment     Pt continues to lack full knee extension and flexion that improve within treatment session. Poor carryover with knee flexion between sessions. However, able to achieve with manual and therex. PT progressed strengthening today with shuttle kick backs with appropriate level of fatigue achieved. PT also altered body mechanics with staggered stance for sit to stands to bias weight bearing on RLE. [place] continued to be utilized to improve quad strength.     Tiki Is progressing well towards his goals.   Pt prognosis is Good.     Pt will continue to benefit from skilled outpatient physical therapy to address the deficits listed in the problem list box on initial evaluation, provide pt/family education and to maximize pt's level of independence in the home and community environment.     Pt's spiritual, cultural and educational needs considered and pt agreeable to plan of care and goals.     Anticipated barriers to physical therapy: anxiety    Short Term GOALS: 4 weeks. Pt agrees with goals set.   1. Patient demonstrates independence with HEP. met  2. Patient demonstrates increased right knee ROM 0  to 100 to improve tolerance to functional activities pain free. Progressing, not met  3. Patient demonstrates ability to perform Timed up and Go in 20 seconds  with LRAD to improve mobility and decrease risk for falls. met  4. Patient demonstrates ability to perform 15 sit to stands in 30 seconds to improve bilateral LE strength. Progressing, not met     Long Term GOALS: 8 weeks. Pt agrees with goals set.  1. Patient demonstrates increased right knee ROM 0 to 120 to improve tolerance to functional activities pain free.  Progressing, not met  2. Patient demonstrates increased strength BLE's to 4+/5 or greater to improve tolerance to functional activities pain free.  Progressing, not met  3. Patient demonstrates improved overall function per FOTO Knee Survey to 42% Limitation or less.  Progressing, not met  4. Patient demonstrates ability to perform Timed up and Go in 15 seconds or less with LRAD to improve mobility and decrease risk for falls. met  5. Patient demonstrates ability to walk 450ft in 2 minute with LRAD to improve mobility and endurance. Progressing, not met  6. Patient goal: patient demonstrates ability to kneel down onto ground and return to standing with little to no difficulty to PLOF and perform job tasks. Progressing, not met    Plan     Improve quad strength to assist with maintaining knee extension; improve knee flexion. Resume Recumbent bike at [place] if equipment available.     Meagan Smith, PT,DPT  09/21/2022

## 2022-09-21 ENCOUNTER — CLINICAL SUPPORT (OUTPATIENT)
Dept: REHABILITATION | Facility: HOSPITAL | Age: 62
End: 2022-09-21
Payer: OTHER GOVERNMENT

## 2022-09-21 DIAGNOSIS — R26.89 ANTALGIC GAIT: ICD-10-CM

## 2022-09-21 DIAGNOSIS — R29.898 DECREASED STRENGTH OF LOWER EXTREMITY: ICD-10-CM

## 2022-09-21 DIAGNOSIS — M25.661 DECREASED ROM OF RIGHT KNEE: Primary | ICD-10-CM

## 2022-09-21 PROCEDURE — 97140 MANUAL THERAPY 1/> REGIONS: CPT | Mod: PN

## 2022-09-21 PROCEDURE — 97110 THERAPEUTIC EXERCISES: CPT | Mod: PN

## 2022-09-23 ENCOUNTER — CLINICAL SUPPORT (OUTPATIENT)
Dept: REHABILITATION | Facility: HOSPITAL | Age: 62
End: 2022-09-23
Payer: OTHER GOVERNMENT

## 2022-09-23 DIAGNOSIS — R26.89 ANTALGIC GAIT: ICD-10-CM

## 2022-09-23 DIAGNOSIS — M25.661 DECREASED ROM OF RIGHT KNEE: Primary | ICD-10-CM

## 2022-09-23 DIAGNOSIS — R29.898 DECREASED STRENGTH OF LOWER EXTREMITY: ICD-10-CM

## 2022-09-23 PROCEDURE — 97140 MANUAL THERAPY 1/> REGIONS: CPT | Mod: PN,CQ

## 2022-09-23 PROCEDURE — 97110 THERAPEUTIC EXERCISES: CPT | Mod: PN,CQ

## 2022-09-23 NOTE — PROGRESS NOTES
"  Physical Therapy Daily Treatment Note     Name: Tiki Willoughby  Clinic Number: [id number]    Therapy Diagnosis:   Encounter Diagnoses   Name Primary?    Decreased ROM of right knee Yes    Decreased strength of lower extremity     Antalgic gait          Physician: Stevo Kohler MD    Visit Date: 9/23/2022    Physician Orders: PT Eval and Treat   Medical Diagnosis from Referral: Z96.651 (ICD-10-CM) - Status post right knee replacement  Evaluation Date: 8/5/2022    PN Due: 10/1/22  Authorization Period Expiration: 2/05/2023  Plan of Care Expiration: 11/5/22  Visit # / Visits authorized: 13/ 15 (+eval)  PTA Visit #: 1/5     Time In: 1010  Time Out: 1115  Total Billable Time: 65 minutes      Precautions: Diabetes and HTN      S/p right TKA Dr. Kohler 7/14/22  POW 9 (9/15-9/22)    Subjective     Pt reports: Pt states his knee is feeling a little better. Still feels tight but not as painful.   He was compliant with home exercise program.  Response to previous treatment: no adverse response  Functional change: no changes noted    Pain: 2-3/10  Location: right knee      Objective     9/23/22  AAROM knee flexion= 100    Tiki received therapeutic exercises to develop strength, ROM and flexibility for 50 minutes including: bolded = performed      LAQ 2# (5 minutes with Russian E-stim 5/5)  SAQ ( 5 minutes with Russian E-stim 5/5)  step up 6" 3x10 RLE    single leg shuttle (R) 1.5 cords +3x10  shuttle kick back 1 band 2x10 ea  TKE red sport cord 5"x20 ( 5 minutes with Russian E-stim 5/5)    sit to stand 18" plyo 3x10 +10#kb (staggered stance with RLE in back to improve weight bearing)  Heelslides 20x10"hold  Nustep seat 9 end of session for ROM 5 minutes  +TRX squats with blue monster band for anterior pull on tibia for increased flexion 2x10  Recumbent bike, seat 13, 6minutes (at end of session)    Tiki received the following manual therapy techniques: Joint mobilizations and Soft tissue Mobilization were applied to " the: Right knee for 15 minutes, including:    Superior<>inferior patella grade II-III at end ranges  Anterior tibiafemoral joint mobs for extension  at end range  Posterior tibiofemoral joint mobs for flexion at end range      Tiki received cold pack for 00 minutes to Right knee       Home Exercises Provided and Patient Education Provided     Education provided:   -     Written Home Exercises Provided: Patient instructed to cont prior HEP.  Exercises were reviewed and Tiki was able to demonstrate them prior to the end of the session.  Tiki demonstrated good  understanding of the education provided.     See EMR under Patient Instructions for exercises provided  during therapy sessions . 8/5/22, 8/23/22, 9/9/22    Assessment     Introduced TRX squats today with blue monster band to promote anterior translation of tibia to increase knee flexion. Pt admits increased stretch in knee and increased knee flexion/squat depth noted. He did require some cues to ensure equal weight distribution but did well otherwise. Pt continues to be limited in knee extension and continued use of Estim for improved quad recruitment patterns. Pt able to make full revolutions on recumbent bike at end of session. Admits increased soreness/fatigue but denies significant increase in overall pain symptoms. Will continue progressing as tolerated.     Tiki Is progressing well towards his goals.   Pt prognosis is Good.     Pt will continue to benefit from skilled outpatient physical therapy to address the deficits listed in the problem list box on initial evaluation, provide pt/family education and to maximize pt's level of independence in the home and community environment.     Pt's spiritual, cultural and educational needs considered and pt agreeable to plan of care and goals.     Anticipated barriers to physical therapy: anxiety    Short Term GOALS: 4 weeks. Pt agrees with goals set.   1. Patient demonstrates independence with HEP. met  2.  Patient demonstrates increased right knee ROM 0  to 100 to improve tolerance to functional activities pain free. Progressing, not met  3. Patient demonstrates ability to perform Timed up and Go in 20 seconds with LRAD to improve mobility and decrease risk for falls. met  4. Patient demonstrates ability to perform 15 sit to stands in 30 seconds to improve bilateral LE strength. Progressing, not met     Long Term GOALS: 8 weeks. Pt agrees with goals set.  1. Patient demonstrates increased right knee ROM 0 to 120 to improve tolerance to functional activities pain free.  Progressing, not met  2. Patient demonstrates increased strength BLE's to 4+/5 or greater to improve tolerance to functional activities pain free.  Progressing, not met  3. Patient demonstrates improved overall function per FOTO Knee Survey to 42% Limitation or less.  Progressing, not met  4. Patient demonstrates ability to perform Timed up and Go in 15 seconds or less with LRAD to improve mobility and decrease risk for falls. met  5. Patient demonstrates ability to walk 450ft in 2 minute with LRAD to improve mobility and endurance. Progressing, not met  6. Patient goal: patient demonstrates ability to kneel down onto ground and return to standing with little to no difficulty to PLOF and perform job tasks. Progressing, not met    Plan     Improve quad strength to assist with maintaining knee extension; improve knee flexion. Resume Recumbent bike at [place] if equipment available.     Yoel Flores, PTA  09/23/2022                       Hide Include Location In Plan Question?: No Detail Level: Simple

## 2022-09-27 ENCOUNTER — CLINICAL SUPPORT (OUTPATIENT)
Dept: REHABILITATION | Facility: HOSPITAL | Age: 62
End: 2022-09-27
Payer: OTHER GOVERNMENT

## 2022-09-27 DIAGNOSIS — R29.898 DECREASED STRENGTH OF LOWER EXTREMITY: ICD-10-CM

## 2022-09-27 DIAGNOSIS — M25.661 DECREASED ROM OF RIGHT KNEE: Primary | ICD-10-CM

## 2022-09-27 DIAGNOSIS — R26.89 ANTALGIC GAIT: ICD-10-CM

## 2022-09-27 PROCEDURE — 97110 THERAPEUTIC EXERCISES: CPT | Mod: PN

## 2022-09-27 PROCEDURE — 97112 NEUROMUSCULAR REEDUCATION: CPT | Mod: PN

## 2022-09-27 PROCEDURE — 97140 MANUAL THERAPY 1/> REGIONS: CPT | Mod: PN

## 2022-09-27 NOTE — PROGRESS NOTES
"  Physical Therapy Daily Treatment Note     Name: Tiki Willoughby  Clinic Number: [id number]    Therapy Diagnosis:   Encounter Diagnoses   Name Primary?    Decreased ROM of right knee Yes    Decreased strength of lower extremity     Antalgic gait            Physician: Stevo Kohler MD    Visit Date: 9/27/2022    Physician Orders: PT Eval and Treat   Medical Diagnosis from Referral: Z96.651 (ICD-10-CM) - Status post right knee replacement  Evaluation Date: 8/5/2022      PN Due: 10/1/22  Authorization Period Expiration: 2/05/2023  Plan of Care Expiration: 11/5/22  Visit # / Visits authorized: 14/ 15 (+eval)  PTA Visit #: 0/5     Time In: 8:02am  Time Out: 9:02am  Total Billable Time: 60 minutes (1NMR, 1MT, 2TE)     Precautions: Diabetes and HTN      S/p right TKA Dr. Kohler 7/14/22  POW 10    Subjective     Pt reports: "Oh its about the same, always has a little bit of pain in it."  He was compliant with home exercise program.  Response to previous treatment: no adverse response  Functional change: no changes noted    Pain: 2-3/10  Location: right knee      Objective     9/27/2022  Knee range of motion: 0-6-92 pre tx  Knee range of motion : 0-5-105 post tx     bolded = performed    Tiki received therapeutic exercises to develop strength, ROM and flexibility for 35 minutes including:     Heel prop 5 min 5# above, 5# below knee   single leg shuttle (R) 2 cords (50#) c 5" TKE - 3x10   shuttle kick back 1 band 2x10 ea  sit to stand 18" plyo 3x10 10#kb   Heelslides 3min 5"   Recumbent bike, seat 13, 6minutes (at end of session)    Tiki participated in neuromuscular re-education activities to improve: Kinesthetic, Sense, Proprioception, and Posture for 10 minutes. The following activities were included:  Short arc quads c NMES   Long arc quad c NMES     Neuromuscular electrical stimulation was applied to the above exercises with 50% Duty cycle, 10/10 cycle time, Burst Freq of 50 bps and Ramp of 2 sec at 90 mA " cc    Tiki received the following manual therapy techniques: Joint mobilizations and Soft tissue Mobilization were applied to the: Right knee for 15 minutes, including:    Superior<>inferior patella grade II-III at end ranges  Infrapateller fat pad mobilization  Posterior Femoral glides with blocked tibial for knee ext   Posterior tibial joint mobs for flexion   Passive range of motion c overpressure and medial tibial rotation into knee flexion     Tiki received cold pack for 00 minutes to Right knee       Home Exercises Provided and Patient Education Provided     Education provided:   - Pt educated on continuation of home exercise program     Written Home Exercises Provided: Patient instructed to cont prior HEP.  Exercises were reviewed and Tiki was able to demonstrate them prior to the end of the session.  Tiki demonstrated good  understanding of the education provided.     See EMR under Patient Instructions for exercises provided  during therapy sessions . 8/5/22, 8/23/22, 9/9/22    Assessment     Pt presents to PT with continued lack of terminal knee extension and lacking functional knee flexion past 105 which was only after manual treatment. Pt lack of terminal knee extension is primary hindrance for gait. Pt continues to demonstrate poor quad control and requires cuing in order to perform correctly. CKC terminal knee extension added to shuttle in order to promote better mechanics. Pt struggled with this throughout tx. Pt to continue with range of motion of the knee in conjunction with quad control.     Tiki Is progressing well towards his goals.   Pt prognosis is Good.     Pt will continue to benefit from skilled outpatient physical therapy to address the deficits listed in the problem list box on initial evaluation, provide pt/family education and to maximize pt's level of independence in the home and community environment.     Pt's spiritual, cultural and educational needs considered and pt  agreeable to plan of care and goals.     Anticipated barriers to physical therapy: anxiety    Short Term GOALS: 4 weeks. Pt agrees with goals set.   1. Patient demonstrates independence with HEP. met  2. Patient demonstrates increased right knee ROM 0  to 100 to improve tolerance to functional activities pain free. Progressing, not met  3. Patient demonstrates ability to perform Timed up and Go in 20 seconds with LRAD to improve mobility and decrease risk for falls. met  4. Patient demonstrates ability to perform 15 sit to stands in 30 seconds to improve bilateral LE strength. Progressing, not met     Long Term GOALS: 8 weeks. Pt agrees with goals set.  1. Patient demonstrates increased right knee ROM 0 to 120 to improve tolerance to functional activities pain free.  Progressing, not met  2. Patient demonstrates increased strength BLE's to 4+/5 or greater to improve tolerance to functional activities pain free.  Progressing, not met  3. Patient demonstrates improved overall function per FOTO Knee Survey to 42% Limitation or less.  Progressing, not met  4. Patient demonstrates ability to perform Timed up and Go in 15 seconds or less with LRAD to improve mobility and decrease risk for falls. met  5. Patient demonstrates ability to walk 450ft in 2 minute with LRAD to improve mobility and endurance. Progressing, not met  6. Patient goal: patient demonstrates ability to kneel down onto ground and return to standing with little to no difficulty to PLOF and perform job tasks. Progressing, not met    Plan     Improve quad strength to assist with maintaining knee extension; improve knee flexion. Resume Recumbent bike at [place] if equipment available.     Guadalupe Perez, PT  09/27/2022

## 2022-09-29 ENCOUNTER — DOCUMENTATION ONLY (OUTPATIENT)
Dept: REHABILITATION | Facility: HOSPITAL | Age: 62
End: 2022-09-29
Payer: OTHER GOVERNMENT

## 2022-09-29 NOTE — PROGRESS NOTES
Missed Visit/Cancellation      Date: 9/29/2022         Canceled Number: 1  No Show Number: 0                                                                                                                Pt initially had visit scheduled for today for 0800.   Reason for cancellation: no reason provided.    Pt's next scheduled physical therapy visit is 10/10/22.    Cary Mulligan, PT, DPT  9/29/2022

## 2022-10-03 ENCOUNTER — PATIENT MESSAGE (OUTPATIENT)
Dept: ORTHOPEDICS | Facility: CLINIC | Age: 62
End: 2022-10-03
Payer: OTHER GOVERNMENT

## 2022-10-03 DIAGNOSIS — Z96.651 STATUS POST RIGHT KNEE REPLACEMENT: ICD-10-CM

## 2022-10-03 DIAGNOSIS — M17.11 UNILATERAL PRIMARY OSTEOARTHRITIS, RIGHT KNEE: Primary | ICD-10-CM

## 2022-10-10 ENCOUNTER — CLINICAL SUPPORT (OUTPATIENT)
Dept: REHABILITATION | Facility: HOSPITAL | Age: 62
End: 2022-10-10
Payer: OTHER GOVERNMENT

## 2022-10-10 DIAGNOSIS — M25.661 DECREASED ROM OF RIGHT KNEE: Primary | ICD-10-CM

## 2022-10-10 DIAGNOSIS — Z96.651 STATUS POST RIGHT KNEE REPLACEMENT: ICD-10-CM

## 2022-10-10 DIAGNOSIS — R26.89 ANTALGIC GAIT: ICD-10-CM

## 2022-10-10 DIAGNOSIS — R29.898 DECREASED STRENGTH OF LOWER EXTREMITY: ICD-10-CM

## 2022-10-10 PROCEDURE — 97112 NEUROMUSCULAR REEDUCATION: CPT | Mod: PN,CQ

## 2022-10-10 PROCEDURE — 97110 THERAPEUTIC EXERCISES: CPT | Mod: PN,CQ

## 2022-10-10 PROCEDURE — 97140 MANUAL THERAPY 1/> REGIONS: CPT | Mod: PN,CQ

## 2022-10-10 NOTE — PROGRESS NOTES
"  Physical Therapy Daily Treatment Note     Name: Tiki Willoughby  Clinic Number: [id number]    Therapy Diagnosis:   Encounter Diagnoses   Name Primary?    Status post right knee replacement     Decreased ROM of right knee Yes    Decreased strength of lower extremity     Antalgic gait            Physician: Stevo Kohler MD    Visit Date: 10/10/2022    Physician Orders: PT Eval and Treat   Medical Diagnosis from Referral: Z96.651 (ICD-10-CM) - Status post right knee replacement  Evaluation Date: 8/5/2022      PN Due: 10/1/22  Authorization Period Expiration: 2/05/2023  Plan of Care Expiration: 11/5/22  Visit # / Visits authorized: 16/ 15 (+eval)  PTA Visit #: 0/5     Time In: 0705AM  Time Out: 0755AM  Total Billable Time: 45 minutes (1NMR, 1MT, 2TE)     Precautions: Diabetes and HTN      S/p right TKA Dr. Kohler 7/14/22  POW 10    Subjective     Pt reports: he has swelling and stiffness.     He was compliant with home exercise program.  Response to previous treatment: no adverse response  Functional change: no changes noted    Pain: 3/10  Location: right knee      Objective       Tiki received therapeutic exercises to develop strength, ROM and flexibility for 40 minutes including:     Heel prop 5 min 5# above, 5# below knee   single leg shuttle (R) 2 cords (50#) c 5" TKE - 3x10   shuttle kick back 1 band 2x10 ea  sit to stand 18" plyo 3x10 10#kb   Heelslides 3min 5"   Recumbent bike, seat 13, 6minutes (at end of session)    Tiki participated in neuromuscular re-education activities to improve: Kinesthetic, Sense, Proprioception, and Posture for 10 minutes. The following activities were included:  Short arc quads c NMES     Neuromuscular electrical stimulation was applied to the above exercises with 50% Duty cycle, 10/10 cycle time, Burst Freq of 50 bps and Ramp of 2 sec at 90 mA cc    Tiki received the following manual therapy techniques: Joint mobilizations and Soft tissue Mobilization were applied to the: " Right knee for 10 minutes, including:    Superior<>inferior patella grade II-III at end ranges  Infrapateller fat pad mobilization  Posterior Femoral glides with blocked tibial for knee ext   Posterior tibial joint mobs for flexion     Tiki received cold pack for 00 minutes to Right knee       Home Exercises Provided and Patient Education Provided     Education provided:   - Pt educated on continuation of home exercise program     Written Home Exercises Provided: Patient instructed to cont prior HEP.  Exercises were reviewed and Tiki was able to demonstrate them prior to the end of the session.  Tiki demonstrated good  understanding of the education provided.     See EMR under Patient Instructions for exercises provided  during therapy sessions . 8/5/22, 8/23/22, 9/9/22    Assessment     Tiki tolerated the above tx session well with minimal c/o pain. Pt presents with reports of increased swelling and mild pain in surgical limb. Continued with interventions from previous session, but with modifications 2/2 time. Pt's patella continues to be hypomobile, and quad strength limited. Pt challenged significantly with shuttle squats as knee extension was increased during. Pt encouraged to continue HEP especially therEx for improved ROM and quad strength.    Tiki Is progressing well towards his goals.   Pt prognosis is Good.     Pt will continue to benefit from skilled outpatient physical therapy to address the deficits listed in the problem list box on initial evaluation, provide pt/family education and to maximize pt's level of independence in the home and community environment.     Pt's spiritual, cultural and educational needs considered and pt agreeable to plan of care and goals.     Anticipated barriers to physical therapy: anxiety    Short Term GOALS: 4 weeks. Pt agrees with goals set.   1. Patient demonstrates independence with HEP. met  2. Patient demonstrates increased right knee ROM 0  to 100 to improve  tolerance to functional activities pain free. Progressing, not met  3. Patient demonstrates ability to perform Timed up and Go in 20 seconds with LRAD to improve mobility and decrease risk for falls. met  4. Patient demonstrates ability to perform 15 sit to stands in 30 seconds to improve bilateral LE strength. Progressing, not met     Long Term GOALS: 8 weeks. Pt agrees with goals set.  1. Patient demonstrates increased right knee ROM 0 to 120 to improve tolerance to functional activities pain free.  Progressing, not met  2. Patient demonstrates increased strength BLE's to 4+/5 or greater to improve tolerance to functional activities pain free.  Progressing, not met  3. Patient demonstrates improved overall function per FOTO Knee Survey to 42% Limitation or less.  Progressing, not met  4. Patient demonstrates ability to perform Timed up and Go in 15 seconds or less with LRAD to improve mobility and decrease risk for falls. met  5. Patient demonstrates ability to walk 450ft in 2 minute with LRAD to improve mobility and endurance. Progressing, not met  6. Patient goal: patient demonstrates ability to kneel down onto ground and return to standing with little to no difficulty to PLOF and perform job tasks. Progressing, not met    Plan     Improve quad strength to assist with maintaining knee extension; improve knee flexion. Resume Recumbent bike at [place] if equipment available.     Mary Alice Al, PTA  10/10/2022

## 2022-11-11 ENCOUNTER — OFFICE VISIT (OUTPATIENT)
Dept: ORTHOPEDICS | Facility: CLINIC | Age: 62
End: 2022-11-11
Payer: OTHER GOVERNMENT

## 2022-11-11 VITALS — WEIGHT: 243 LBS | HEIGHT: 70 IN | BODY MASS INDEX: 34.79 KG/M2

## 2022-11-11 DIAGNOSIS — Z96.651 STATUS POST RIGHT KNEE REPLACEMENT: Primary | ICD-10-CM

## 2022-11-11 PROCEDURE — 99999 PR PBB SHADOW E&M-EST. PATIENT-LVL III: ICD-10-PCS | Mod: PBBFAC,,, | Performed by: ORTHOPAEDIC SURGERY

## 2022-11-11 PROCEDURE — 99213 OFFICE O/P EST LOW 20 MIN: CPT | Mod: S$PBB,,, | Performed by: ORTHOPAEDIC SURGERY

## 2022-11-11 PROCEDURE — 99213 PR OFFICE/OUTPT VISIT, EST, LEVL III, 20-29 MIN: ICD-10-PCS | Mod: S$PBB,,, | Performed by: ORTHOPAEDIC SURGERY

## 2022-11-11 PROCEDURE — 99213 OFFICE O/P EST LOW 20 MIN: CPT | Mod: PBBFAC,PN | Performed by: ORTHOPAEDIC SURGERY

## 2022-11-11 PROCEDURE — 99999 PR PBB SHADOW E&M-EST. PATIENT-LVL III: CPT | Mod: PBBFAC,,, | Performed by: ORTHOPAEDIC SURGERY

## 2022-11-11 NOTE — PROGRESS NOTES
Ortho Knee Follow Up Note    PCP: Primary Doctor No   Referring Provider: No referring provider defined for this encounter.        Assessment:  62 y.o. male status post right total Knee Primary completed on 7/14/22.  Patient is now 3 months postop.  He has not had therapy for the last month despite as placing referral as [facility]  He continued to have significant quadriceps weakness compared to his other side.  He has muscle spasms as he sits in the clinic room today in his quad muscle.  It is clearly not supporting his knee and he has a fear of falls in his still on a cane.  He would benefit greatly from ongoing physical therapy.  Continues to have stiffness in his knee in his range of motion is approximately 5 to 100 °.  He does not report any functional limitations in his day-to-day activities with regard to this range of motion.  It is really related to the continued weakness.  His pain is manageable and there was a significant improvement pre and postop.  I offered him a manipulation today in terms of getting his range of motion back however again this does not appear to be the primary cause his ongoing concerns.  I am also fearful that after doing the manipulation there would be some hiccups in his physical therapy as he would need strict compliance in order to maintain the motion.  He will call me back if he wishes to consider the manipulation otherwise we will continue forward with ongoing physical therapy.    Plan:  Follow up 12 months  Future Radiographs Indicated at next visit: no    Pain Management: OTC  Anticoagulation: None  Wound Care: None    Patient Reassurance:   Post-operative course discussed with patient. Patient reassured and supported. All questions answered.    ACTIVE PROBLEM LIST  Patient Active Problem List   Diagnosis    Decreased ROM of right knee    Decreased strength of lower extremity    Antalgic gait           HPI:  Tiki Willoughby presents today for a post-op  "visit.     STATUS POST:  right total Knee Primary  BMI: Body mass index is 34.87 kg/m².    Post operative recovery was complicated by: Stiffness    Patient rates his condition as improving. Pleased with surgical outcome to date.     Functional Assessment:  Completed Outpatient PT  Functional Difficulties:  Walking  Pain Medication: Non narcotic  Anticoagulation: None    EXAM:    right POST-OPERATIVE KNEE    Ht 5' 10" (1.778 m)   Wt 110.2 kg (243 lb)   BMI 34.87 kg/m²     Skin:  Appropriate post op appearance, No evidence of erythema, warmth, discharge, or drainage and Incision clean/dry/intact  Range of Motion: Flexion: 100, Extension 5  Neurovascular Status: Sensation intact in Sural, Saphenous, SPN, DPN and Tibial nerve distribution. 5 out of 5 strength in hip flexion, knee flexion/extension, ankle plantarflexion/dorsiflexion. 2+ dorsalis pedis    IMAGING:  X-ray Knee:   Implants are well fixed and aligned. There is no evidence of loosening.       "

## 2023-05-10 ENCOUNTER — PATIENT MESSAGE (OUTPATIENT)
Dept: ORTHOPEDICS | Facility: CLINIC | Age: 63
End: 2023-05-10
Payer: OTHER GOVERNMENT

## 2023-05-10 DIAGNOSIS — Z96.651 STATUS POST RIGHT KNEE REPLACEMENT: Primary | ICD-10-CM

## 2023-05-11 ENCOUNTER — OFFICE VISIT (OUTPATIENT)
Dept: ORTHOPEDICS | Facility: CLINIC | Age: 63
End: 2023-05-11
Attending: ORTHOPAEDIC SURGERY
Payer: OTHER GOVERNMENT

## 2023-05-11 VITALS — BODY MASS INDEX: 34.79 KG/M2 | HEIGHT: 70 IN | WEIGHT: 243 LBS

## 2023-05-11 DIAGNOSIS — M24.671: ICD-10-CM

## 2023-05-11 DIAGNOSIS — Z96.651 STATUS POST RIGHT KNEE REPLACEMENT: Primary | ICD-10-CM

## 2023-05-11 PROCEDURE — 99213 PR OFFICE/OUTPT VISIT, EST, LEVL III, 20-29 MIN: ICD-10-PCS | Mod: S$PBB,,, | Performed by: ORTHOPAEDIC SURGERY

## 2023-05-11 PROCEDURE — 99999 PR PBB SHADOW E&M-EST. PATIENT-LVL III: ICD-10-PCS | Mod: PBBFAC,,, | Performed by: ORTHOPAEDIC SURGERY

## 2023-05-11 PROCEDURE — 99213 OFFICE O/P EST LOW 20 MIN: CPT | Mod: PBBFAC,PN | Performed by: ORTHOPAEDIC SURGERY

## 2023-05-11 PROCEDURE — 99213 OFFICE O/P EST LOW 20 MIN: CPT | Mod: S$PBB,,, | Performed by: ORTHOPAEDIC SURGERY

## 2023-05-11 PROCEDURE — 99999 PR PBB SHADOW E&M-EST. PATIENT-LVL III: CPT | Mod: PBBFAC,,, | Performed by: ORTHOPAEDIC SURGERY

## 2023-05-11 NOTE — PROGRESS NOTES
Ortho Knee Follow Up Note    PCP: Primary Doctor No   Referring Provider: Stevo Kohler MD  [address] [facility] [facility]  [place] [address]        Assessment:  62 y.o. male status post right total Knee Primary completed on 7/14/22.  Patient is now 10 months postop.      To review: Patient completed PT post op but [facility] were [facility]. He suffered from significant quadriceps weakness compared to his other side. Today, it is still weak compared to his contralateral leg and there is atrophy but it is less severe than when I last saw him. He was approved for more PT about a month ago and has been in this. He continues to have complaints of his knee not supporting his knee and he has a fear of falls in his still on a cane. He continues to have stiffness in his knee in his range of motion is approximately 5 to 100 °.  He does not report any functional limitations in his day-to-day activities with regard to this range of motion. We previously discussed JOANNA but elected against this.     His radiographs today do not demonstrate evidence of loosening. There is no concern for infection clinically. He does have some residual midflexion instability but this is comparable to his other knee which he trusts. I continue to this this is related to his quad weakness. I discussed possibility of something neuropathic going on and he has a history of lumbar complaints. He is adamant against considering future surgery and anything simple to address this feeling of instability from a surgical standpoint like a poly swap would worsen his flexion contracture. A full revision is less advisable with his minimal pain. Will try a brace to see if this helps him get back to work. He will continue with PT and let me know if anything more I should do.     Plan:  Follow up 12 months  Future Radiographs Indicated at next visit: no    Pain Management: OTC  Anticoagulation: None  Wound Care: None    Patient Reassurance:  "  Post-operative course discussed with patient. Patient reassured and supported. All questions answered.    ACTIVE PROBLEM LIST  Patient Active Problem List   Diagnosis    Decreased ROM of right knee    Decreased strength of lower extremity    Antalgic gait           HPI:  Tiki Willoughby presents today for a post-op visit.     STATUS POST:  right total Knee Primary  BMI: Body mass index is 34.87 kg/m².    Post operative recovery was complicated by: Stiffness    Patient rates his condition as improving. Pleased with surgical outcome to date.     Functional Assessment:  Completed Outpatient PT  Functional Difficulties:  Walking  Pain Medication: Non narcotic  Anticoagulation: None    EXAM:    right POST-OPERATIVE KNEE    Ht 5' 10" (1.778 m)   Wt 110.2 kg (243 lb)   BMI 34.87 kg/m²     Skin:  Appropriate post op appearance, No evidence of erythema, warmth, discharge, or drainage and Incision clean/dry/intact  Range of Motion: Flexion: 105, Extension 5  Some midflexion and deep flexion instability but not significant   Neurovascular Status: Sensation intact in Sural, Saphenous, SPN, DPN and Tibial nerve distribution. 5 out of 5 strength in hip flexion, knee flexion/extension, ankle plantarflexion/dorsiflexion. 2+ dorsalis pedis    IMAGING:  X-ray Knee:   Implants are well fixed and aligned. There is no evidence of loosening.         "

## (undated) DEVICE — SEE MEDLINE ITEM 157150

## (undated) DEVICE — BANDAGE MATRIX HK LOOP 6IN 5YD

## (undated) DEVICE — BLANKET UPPER BODY 78.7X29.9IN

## (undated) DEVICE — ARMCRADLE BLUE POLY FOAM

## (undated) DEVICE — GLOVE SURGICAL LATEX SZ 6.5

## (undated) DEVICE — TOURNIQUET SB QC DP 34X4IN

## (undated) DEVICE — APPLICATOR CHLORAPREP ORN 26ML

## (undated) DEVICE — HOOD FLYTE PEELWY STERISHIELD

## (undated) DEVICE — GLOVE SURGICAL LATEX SZ 8

## (undated) DEVICE — KIT VIZADISC KNEE TRACKING

## (undated) DEVICE — DRAPE STERI U-SHAPED 47X51IN

## (undated) DEVICE — UNDERGLOVES BIOGEL PI SZ 7 LF

## (undated) DEVICE — SOL IRR NACL .9% 3000ML

## (undated) DEVICE — KIT DRAPE RIO ONE PIECE W/POCK

## (undated) DEVICE — Device

## (undated) DEVICE — SUT VICRYL+ 1 CT1 18IN

## (undated) DEVICE — SEE MEDLINE ITEM 157131

## (undated) DEVICE — PAD COLD THERAPY KNEE WRAP ON

## (undated) DEVICE — COVER OVERHEAD SURG LT BLUE

## (undated) DEVICE — SEE L#120831

## (undated) DEVICE — SUT STRATAFIX PDS 2-0 CT-1 9IN

## (undated) DEVICE — PULSAVAC ZIMMER

## (undated) DEVICE — SYS CLSR DERMABOND PRINEO 22CM

## (undated) DEVICE — TOWEL OR DISP STRL BLUE 4/PK

## (undated) DEVICE — SYR 50CC LL

## (undated) DEVICE — BLADE SAW OSC 19.5 X 1.2 X 90

## (undated) DEVICE — ELECTRODE REM PLYHSV RETURN 9

## (undated) DEVICE — SUT STRATAFIX PDS PLS 45CM

## (undated) DEVICE — SUT VICRYL PLUS 2-0 CT1 18

## (undated) DEVICE — NDL 18GA X1 1/2 REG BEVEL

## (undated) DEVICE — GAUZE SPONGE 4X4 12PLY

## (undated) DEVICE — UNDERGLOVES BIOGEL PI SIZE 8

## (undated) DEVICE — KIT TRIATHLON CR TIB PREP SZ7

## (undated) DEVICE — BETADINE OPTHALMIC SOL 5% 30ML

## (undated) DEVICE — KIT TRIATHLON CR FEM PREP SZ6

## (undated) DEVICE — NDL SPINAL 20GX3.5 HUB